# Patient Record
Sex: MALE | Race: WHITE | NOT HISPANIC OR LATINO | Employment: FULL TIME | URBAN - METROPOLITAN AREA
[De-identification: names, ages, dates, MRNs, and addresses within clinical notes are randomized per-mention and may not be internally consistent; named-entity substitution may affect disease eponyms.]

---

## 2017-01-05 ENCOUNTER — GENERIC CONVERSION - ENCOUNTER (OUTPATIENT)
Dept: OTHER | Facility: OTHER | Age: 47
End: 2017-01-05

## 2017-06-06 ENCOUNTER — HOSPITAL ENCOUNTER (OUTPATIENT)
Dept: RADIOLOGY | Age: 47
Discharge: HOME | End: 2017-06-06
Attending: NURSE PRACTITIONER | Admitting: FAMILY MEDICINE
Payer: COMMERCIAL

## 2017-06-06 ENCOUNTER — OFFICE VISIT (OUTPATIENT)
Dept: URGENT CARE | Age: 47
End: 2017-06-06
Payer: COMMERCIAL

## 2017-06-06 ENCOUNTER — TRANSCRIBE ORDERS (OUTPATIENT)
Dept: URGENT CARE | Age: 47
End: 2017-06-06

## 2017-06-06 DIAGNOSIS — S69.91XA UNSPECIFIED INJURY OF RIGHT WRIST, HAND AND FINGER(S), INITIAL ENCOUNTER: ICD-10-CM

## 2017-06-06 PROCEDURE — 99282 EMERGENCY DEPT VISIT SF MDM: CPT | Performed by: FAMILY MEDICINE

## 2017-06-06 PROCEDURE — G0381 LEV 2 HOSP TYPE B ED VISIT: HCPCS | Performed by: FAMILY MEDICINE

## 2017-06-06 PROCEDURE — 29280 STRAPPING OF HAND OR FINGER: CPT | Performed by: FAMILY MEDICINE

## 2017-06-06 PROCEDURE — 29130 APPL FINGER SPLINT STATIC: CPT

## 2017-06-06 PROCEDURE — 73140 X-RAY EXAM OF FINGER(S): CPT

## 2017-06-27 ENCOUNTER — ALLSCRIPTS OFFICE VISIT (OUTPATIENT)
Dept: OTHER | Facility: OTHER | Age: 47
End: 2017-06-27

## 2017-06-27 LAB
FLUAV AG SPEC QL IA: NEGATIVE
INFLUENZA B AG (HISTORICAL): NEGATIVE

## 2017-06-28 ENCOUNTER — GENERIC CONVERSION - ENCOUNTER (OUTPATIENT)
Dept: OTHER | Facility: OTHER | Age: 47
End: 2017-06-28

## 2017-07-05 ENCOUNTER — ALLSCRIPTS OFFICE VISIT (OUTPATIENT)
Dept: OTHER | Facility: OTHER | Age: 47
End: 2017-07-05

## 2017-07-12 ENCOUNTER — ALLSCRIPTS OFFICE VISIT (OUTPATIENT)
Dept: OTHER | Facility: OTHER | Age: 47
End: 2017-07-12

## 2017-07-12 ENCOUNTER — APPOINTMENT (OUTPATIENT)
Dept: RADIOLOGY | Age: 47
End: 2017-07-12
Payer: COMMERCIAL

## 2017-07-12 ENCOUNTER — TRANSCRIBE ORDERS (OUTPATIENT)
Dept: ADMINISTRATIVE | Age: 47
End: 2017-07-12

## 2017-07-12 DIAGNOSIS — M25.561 PAIN IN RIGHT KNEE: ICD-10-CM

## 2017-07-12 PROCEDURE — 73562 X-RAY EXAM OF KNEE 3: CPT

## 2017-07-17 ENCOUNTER — GENERIC CONVERSION - ENCOUNTER (OUTPATIENT)
Dept: OTHER | Facility: OTHER | Age: 47
End: 2017-07-17

## 2017-08-22 ENCOUNTER — TRANSCRIBE ORDERS (OUTPATIENT)
Dept: ADMINISTRATIVE | Facility: HOSPITAL | Age: 47
End: 2017-08-22

## 2017-08-22 ENCOUNTER — ALLSCRIPTS OFFICE VISIT (OUTPATIENT)
Dept: OTHER | Facility: OTHER | Age: 47
End: 2017-08-22

## 2017-08-22 DIAGNOSIS — Z12.5 ENCOUNTER FOR SCREENING FOR MALIGNANT NEOPLASM OF PROSTATE: ICD-10-CM

## 2017-08-22 DIAGNOSIS — R14.0 ABDOMINAL DISTENSION (GASEOUS): ICD-10-CM

## 2017-08-22 DIAGNOSIS — R10.9 ABDOMINAL PAIN: ICD-10-CM

## 2017-08-22 DIAGNOSIS — R16.0 HEPATOMEGALY: ICD-10-CM

## 2017-08-22 DIAGNOSIS — K76.9 LIVER DISEASE: ICD-10-CM

## 2017-08-22 DIAGNOSIS — F10.10 ALCOHOL ABUSE, CONTINUOUS: ICD-10-CM

## 2017-08-22 DIAGNOSIS — R14.0 ABDOMINAL DISTENTION: ICD-10-CM

## 2017-08-22 DIAGNOSIS — R10.9 ABDOMINAL PAIN, UNSPECIFIED SITE: Primary | ICD-10-CM

## 2017-08-22 DIAGNOSIS — F10.10 UNCOMPLICATED ALCOHOL ABUSE: ICD-10-CM

## 2017-08-22 DIAGNOSIS — R16.0 HEPATOMEGALY, NOT ELSEWHERE CLASSIFIED: ICD-10-CM

## 2017-08-23 ENCOUNTER — HOSPITAL ENCOUNTER (OUTPATIENT)
Dept: RADIOLOGY | Age: 47
Discharge: HOME | End: 2017-08-23
Payer: COMMERCIAL

## 2017-08-23 DIAGNOSIS — R16.0 HEPATOMEGALY, NOT ELSEWHERE CLASSIFIED: ICD-10-CM

## 2017-08-23 DIAGNOSIS — R14.0 ABDOMINAL DISTENSION (GASEOUS): ICD-10-CM

## 2017-08-23 DIAGNOSIS — R10.9 ABDOMINAL PAIN: ICD-10-CM

## 2017-08-23 DIAGNOSIS — F10.10 UNCOMPLICATED ALCOHOL ABUSE: ICD-10-CM

## 2017-08-23 PROCEDURE — 74177 CT ABD & PELVIS W/CONTRAST: CPT

## 2017-08-23 RX ADMIN — IOHEXOL 100 ML: 350 INJECTION, SOLUTION INTRAVENOUS at 09:52

## 2017-08-30 ENCOUNTER — ALLSCRIPTS OFFICE VISIT (OUTPATIENT)
Dept: OTHER | Facility: OTHER | Age: 47
End: 2017-08-30

## 2017-08-30 ENCOUNTER — TRANSCRIBE ORDERS (OUTPATIENT)
Dept: ADMINISTRATIVE | Facility: HOSPITAL | Age: 47
End: 2017-08-30

## 2017-08-30 DIAGNOSIS — K76.9 UNSPECIFIED CHRONIC LIVER DISEASE WITHOUT MENTION OF ALCOHOL: Primary | ICD-10-CM

## 2017-09-01 ENCOUNTER — HOSPITAL ENCOUNTER (OUTPATIENT)
Dept: MRI IMAGING | Facility: HOSPITAL | Age: 47
Discharge: HOME | End: 2017-09-01
Attending: SURGERY
Payer: COMMERCIAL

## 2017-09-01 DIAGNOSIS — K76.9 LIVER DISEASE: ICD-10-CM

## 2017-09-01 PROCEDURE — 74183 MRI ABD W/O CNTR FLWD CNTR: CPT

## 2017-09-01 PROCEDURE — A9585 GADOBUTROL INJECTION: HCPCS | Performed by: SURGERY

## 2017-09-01 RX ADMIN — GADOBUTROL 8 ML: 604.72 INJECTION INTRAVENOUS at 18:37

## 2017-09-06 ENCOUNTER — GENERIC CONVERSION - ENCOUNTER (OUTPATIENT)
Dept: OTHER | Facility: OTHER | Age: 47
End: 2017-09-06

## 2017-09-09 ENCOUNTER — GENERIC CONVERSION - ENCOUNTER (OUTPATIENT)
Dept: OTHER | Facility: OTHER | Age: 47
End: 2017-09-09

## 2017-09-10 LAB
AFP-TUMOR MARKER (HISTORICAL): 3.1 NG/ML (ref 0–8.3)
BUN SERPL-MCNC: 6 MG/DL (ref 6–24)
BUN/CREA RATIO (HISTORICAL): 12 (ref 9–20)
CREAT SERPL-MCNC: 0.52 MG/DL (ref 0.76–1.27)
EGFR AFRICAN AMERICAN (HISTORICAL): 148 ML/MIN/1.73
EGFR-AMERICAN CALC (HISTORICAL): 128 ML/MIN/1.73
HEPATITIS A IGM ANTIBODY (HISTORICAL): NEGATIVE
HEPATITIS B CORE IGM ANTIBODY (HISTORICAL): NEGATIVE
HEPATITIS B SURFACE ANTIGEN (HISTORICAL): NEGATIVE
HEPATITIS C ANTIBODY (HISTORICAL): 0.1 S/CO RATIO (ref 0–0.9)

## 2017-09-13 ENCOUNTER — HOSPITAL ENCOUNTER (OUTPATIENT)
Dept: MRI IMAGING | Facility: HOSPITAL | Age: 47
Discharge: HOME | End: 2017-09-13
Attending: SURGERY
Payer: COMMERCIAL

## 2017-09-13 DIAGNOSIS — K76.9 UNSPECIFIED CHRONIC LIVER DISEASE WITHOUT MENTION OF ALCOHOL: ICD-10-CM

## 2017-09-13 PROCEDURE — A9581 GADOXETATE DISODIUM INJ: HCPCS | Performed by: SURGERY

## 2017-09-13 PROCEDURE — 74183 MRI ABD W/O CNTR FLWD CNTR: CPT

## 2017-09-13 RX ADMIN — GADOXETATE DISODIUM 10 ML: 181.43 INJECTION, SOLUTION INTRAVENOUS at 19:39

## 2017-09-25 ENCOUNTER — GENERIC CONVERSION - ENCOUNTER (OUTPATIENT)
Dept: OTHER | Facility: OTHER | Age: 47
End: 2017-09-25

## 2017-09-28 ENCOUNTER — ALLSCRIPTS OFFICE VISIT (OUTPATIENT)
Dept: OTHER | Facility: OTHER | Age: 47
End: 2017-09-28

## 2017-09-29 ENCOUNTER — ANESTHESIA EVENT (OUTPATIENT)
Dept: PERIOP | Facility: HOSPITAL | Age: 47
End: 2017-09-29

## 2017-09-29 NOTE — ANESTHESIA PREPROCEDURE EVALUATION
Review of Systems/Medical History  Patient summary reviewed  Chart reviewed      Cardiovascular  Hyperlipidemia, Hypertension ,    Pulmonary  Smoker cigarette smoker , ,        GI/Hepatic    GERD , Liver disease, alcohol related and cirrhosis,        Negative  ROS        Endo/Other    Comment: ETOH abuse   GYN       Hematology  Negative hematology ROS      Musculoskeletal  Negative musculoskeletal ROS        Neurology  Negative neurology ROS      Psychology   Anxiety,                 Anesthesia Plan  ASA Score- 3       Anesthesia Type- IV sedation with anesthesia        Induction- intravenous      Informed Consent  Anesthetic plan and risks discussed with patient

## 2017-10-02 ENCOUNTER — ANESTHESIA (OUTPATIENT)
Dept: PERIOP | Facility: HOSPITAL | Age: 47
End: 2017-10-02

## 2017-10-11 ENCOUNTER — GENERIC CONVERSION - ENCOUNTER (OUTPATIENT)
Dept: OTHER | Facility: OTHER | Age: 47
End: 2017-10-11

## 2017-10-26 ENCOUNTER — GENERIC CONVERSION - ENCOUNTER (OUTPATIENT)
Dept: OTHER | Facility: OTHER | Age: 47
End: 2017-10-26

## 2017-11-21 ENCOUNTER — ANESTHESIA EVENT (OUTPATIENT)
Dept: GASTROENTEROLOGY | Facility: HOSPITAL | Age: 47
End: 2017-11-21
Payer: COMMERCIAL

## 2017-11-28 ENCOUNTER — ANESTHESIA (OUTPATIENT)
Dept: GASTROENTEROLOGY | Facility: HOSPITAL | Age: 47
End: 2017-11-28
Payer: COMMERCIAL

## 2017-12-07 ENCOUNTER — GENERIC CONVERSION - ENCOUNTER (OUTPATIENT)
Dept: OTHER | Facility: OTHER | Age: 47
End: 2017-12-07

## 2017-12-07 ENCOUNTER — GENERIC CONVERSION - ENCOUNTER (OUTPATIENT)
Dept: GASTROENTEROLOGY | Facility: CLINIC | Age: 47
End: 2017-12-07

## 2017-12-07 ENCOUNTER — HOSPITAL ENCOUNTER (OUTPATIENT)
Facility: HOSPITAL | Age: 47
Setting detail: OUTPATIENT SURGERY
Discharge: HOME | End: 2017-12-07
Attending: INTERNAL MEDICINE | Admitting: INTERNAL MEDICINE
Payer: COMMERCIAL

## 2017-12-07 VITALS
SYSTOLIC BLOOD PRESSURE: 132 MMHG | DIASTOLIC BLOOD PRESSURE: 82 MMHG | TEMPERATURE: 98.2 F | OXYGEN SATURATION: 100 % | RESPIRATION RATE: 19 BRPM | HEART RATE: 83 BPM | WEIGHT: 170 LBS | HEIGHT: 70 IN | BODY MASS INDEX: 24.34 KG/M2

## 2017-12-07 RX ORDER — LIDOCAINE HYDROCHLORIDE 10 MG/ML
INJECTION, SOLUTION EPIDURAL; INFILTRATION; INTRACAUDAL; PERINEURAL AS NEEDED
Status: DISCONTINUED | OUTPATIENT
Start: 2017-12-07 | End: 2017-12-07 | Stop reason: SURG

## 2017-12-07 RX ORDER — MULTIVITAMIN
1 CAPSULE ORAL DAILY
COMMUNITY

## 2017-12-07 RX ORDER — FOLIC ACID 1 MG/1
1 TABLET ORAL DAILY
COMMUNITY

## 2017-12-07 RX ORDER — NADOLOL 40 MG/1
40 TABLET ORAL DAILY
Qty: 30 TABLET | Refills: 0 | Status: ON HOLD | OUTPATIENT
Start: 2017-12-07 | End: 2018-01-24 | Stop reason: ALTCHOICE

## 2017-12-07 RX ORDER — SODIUM CHLORIDE 9 MG/ML
100 INJECTION, SOLUTION INTRAVENOUS CONTINUOUS
Status: DISCONTINUED | OUTPATIENT
Start: 2017-12-07 | End: 2017-12-07 | Stop reason: HOSPADM

## 2017-12-07 RX ORDER — PROPOFOL 10 MG/ML
INJECTION, EMULSION INTRAVENOUS AS NEEDED
Status: DISCONTINUED | OUTPATIENT
Start: 2017-12-07 | End: 2017-12-07 | Stop reason: SURG

## 2017-12-07 RX ADMIN — PROPOFOL 150 MG: 10 INJECTION, EMULSION INTRAVENOUS at 12:58

## 2017-12-07 RX ADMIN — PROPOFOL 50 MG: 10 INJECTION, EMULSION INTRAVENOUS at 13:01

## 2017-12-07 RX ADMIN — LIDOCAINE HYDROCHLORIDE 10 ML: 20 SOLUTION ORAL; TOPICAL at 14:04

## 2017-12-07 RX ADMIN — LIDOCAINE HYDROCHLORIDE 50 MG: 10 INJECTION, SOLUTION EPIDURAL; INFILTRATION; INTRACAUDAL; PERINEURAL at 12:58

## 2017-12-07 RX ADMIN — PROPOFOL 50 MG: 10 INJECTION, EMULSION INTRAVENOUS at 13:03

## 2017-12-07 RX ADMIN — PROPOFOL 50 MG: 10 INJECTION, EMULSION INTRAVENOUS at 13:02

## 2017-12-07 RX ADMIN — PROPOFOL 50 MG: 10 INJECTION, EMULSION INTRAVENOUS at 12:59

## 2017-12-07 RX ADMIN — PROPOFOL 50 MG: 10 INJECTION, EMULSION INTRAVENOUS at 13:00

## 2017-12-07 RX ADMIN — PROPOFOL 30 MG: 10 INJECTION, EMULSION INTRAVENOUS at 13:04

## 2017-12-07 RX ADMIN — SODIUM CHLORIDE: 0.9 INJECTION, SOLUTION INTRAVENOUS at 12:34

## 2017-12-07 NOTE — DISCHARGE INSTRUCTIONS
Liquid diet today and soft diet for 1 week from tomorrow  No aspirin, ibuprofen, aleve  Upper Endoscopy   WHAT YOU NEED TO KNOW:   An upper endoscopy is also called an upper gastrointestinal (GI) endoscopy, or an esophagogastroduodenoscopy (EGD)  You may feel bloated, gassy, or have some abdominal discomfort after your procedure  Your throat may be sore for 24 to 36 hours  You may burp or pass gas from air that is still inside your body  DISCHARGE INSTRUCTIONS:   Call 911 for any of the following:   · You have sudden chest pain or trouble breathing  Seek care immediately if:   · You feel dizzy or faint  · You have trouble swallowing  · Your bowel movements are very dark or black  · Your abdomen is hard and firm and you have severe pain  · You vomit blood  Contact your healthcare provider if:   · You feel full or bloated and cannot burp or pass gas  · You have not had a bowel movement for 3 days after your procedure  · You have neck pain  · You have a fever or chills  · You have nausea or are vomiting  · You have a rash or hives  · You have questions or concerns about your endoscopy  Relieve a sore throat:  Suck on throat lozenges or crushed ice  Gargle with a small amount of warm salt water  Mix 1 teaspoon of salt and 1 cup of warm water to make salt water  Relieve gas and discomfort from bloating:  Lie on your right side with a heating pad on your abdomen  Take short walks to help pass gas  Eat small meals until bloating is relieved  Rest after your procedure: You have been given medicine to relax you  Do not  drive or make important decisions until the day after your procedure  Return to your normal activity as directed  You can usually return to work the day after your procedure  Follow up with your healthcare provider as directed:  Write down your questions so you remember to ask them during your visits     © 2017 [address] is for End User's use only and may not be sold, redistributed or otherwise used for commercial purposes  All illustrations and images included in CareNotes® are the copyrighted property of A D A M , Inc  or Ketan Harris  The above information is an  only  It is not intended as medical advice for individual conditions or treatments  Talk to your doctor, nurse or pharmacist before following any medical regimen to see if it is safe and effective for you  Nadolol (By mouth)   Nadolol (NAY-slade-lol)  Treats high blood pressure and angina (chest pain)  A lower blood pressure can reduce the risk of stroke and heart attack  This medicine is a beta-blocker  Brand Name(s): Corgard   There may be other brand names for this medicine  When This Medicine Should Not Be Used:   Do not use this medicine if you have had an allergic reaction to nadolol  Do not use this medicine if you have asthma or certain heart problems  Talk with your doctor about what these heart problems are  How to Use This Medicine:   Tablet  · Take your medicine as directed  Your dose may need to be changed several times to find what works best for you  · Carefully follow your doctor's instructions about any special diet  If a dose is missed:   · Take a dose as soon as you remember  If it is almost time for your next dose, wait until then and take a regular dose  Do not take extra medicine to make up for a missed dose  How to Store and Dispose of This Medicine:   · Store the medicine in a closed container at room temperature, away from heat, moisture, and direct light  · Ask your pharmacist, doctor, or health caregiver about the best way to dispose of any outdated medicine or medicine no longer needed  · Keep all medicine out of the reach of children  Never share your medicine with anyone    Drugs and Foods to Avoid:   Ask your doctor or pharmacist before using any other medicine, including over-the-counter medicines, vitamins, and herbal products  · Make sure your doctor knows if you are also using digoxin (Lanoxin®), reserpine, insulin, or diabetes medicine that you take by mouth (such as glimepiride, glyburide, metformin, Actos®, Janumet®, Alisha Mullet),  Warnings While Using This Medicine:   · Make sure your doctor knows if you are pregnant or breastfeeding, or if you have kidney disease, heart failure, heart disease, diabetes, lung problems (such as chronic bronchitis or emphysema), overactive thyroid, or a history of allergic reactions  · This medicine may worsen the symptoms of heart failure in some patients  Tell your doctor right away if you have trouble breathing, uneven heartbeat, rapid weight gain, or swelling of the face, fingers, feet, or lower legs  · Do not stop using this medicine suddenly  Your doctor will need to slowly decrease your dose before you stop it completely  · This medicine may make you dizzy or drowsy  Avoid driving, using machines, or doing anything else until you know how this medicine affects you  · Do not stop using this medicine without asking your doctor, even if you feel well  This medicine will not cure your high blood pressure, but it will help keep it in normal range  You may have to take blood pressure medicine for the rest of your life  · Make sure any doctor or dentist who treats you knows that you are using this medicine  · Your doctor will check your progress and the effects of this medicine at regular visits  Keep all appointments    Possible Side Effects While Using This Medicine:   Call your doctor right away if you notice any of these side effects:  · Allergic reaction: Itching or hives, swelling in your face or hands, swelling or tingling in your mouth or throat, chest tightness, trouble breathing  · Lightheadedness, dizziness, or fainting  · Swelling in your hands, ankles, or feet, rapid weight gain  · Trouble breathing, cold sweat, bluish-colored skin  · Very slow or uneven heartbeat  · Worsening chest pain  If you notice these less serious side effects, talk with your doctor:   · Mild dizziness or tiredness  If you notice other side effects that you think are caused by this medicine, tell your doctor  Call your doctor for medical advice about side effects  You may report side effects to FDA at 8-429-FDA-6580  © 2017 [address] Information is for End User's use only and may not be sold, redistributed or otherwise used for commercial purposes  The above information is an  only  It is not intended as medical advice for individual conditions or treatments  Talk to your doctor, nurse or pharmacist before following any medical regimen to see if it is safe and effective for you  Esophageal Varices   WHAT YOU NEED TO KNOW:   Esophageal varices are swollen veins in the lower part of your esophagus  They are caused by increased pressure in the blood vessels of your liver  As the pressure builds in your liver, the pressure also builds in the veins in your esophagus  DISCHARGE INSTRUCTIONS:   Medicines:   · Beta-blockers: These decrease the pressure in your liver  · Proton-pump inhibitors: These decrease the amount of acid your stomach makes  This may help keep the varices from breaking open  · Take your medicine as directed  Contact your healthcare provider if you think your medicine is not helping or if you have side effects  Tell him or her if you are allergic to any medicine  Keep a list of the medicines, vitamins, and herbs you take  Include the amounts, and when and why you take them  Bring the list or the pill bottles to follow-up visits  Carry your medicine list with you in case of an emergency  Follow up with your healthcare provider as directed: You may need to return for more treatments  Write down your questions so you remember to ask them during your visits  Prevent your varices from bleeding:   · Do not drink alcohol:   This will help prevent further damage to your esophagus and liver  · Eat healthy foods:  Healthy foods include fruits, vegetables, whole-grain breads, low-fat dairy products, beans, lean meats, and fish  Ask if you need to be on a special diet  You may need to eat foods that reduce stomach acid  Stomach acid can get into your esophagus and cause the varices to break open and bleed  · Limit sodium:  You may need to decrease the amount of sodium you eat if you have swelling caused by fluid buildup  Fluid buildup can cause increased pressure in your veins  Sodium is found in table salt and salty foods such as canned foods, frozen foods, and potato chips  · Drink liquids as directed: Too much liquid can increase the pressure in your veins  Ask your healthcare provider how much liquid to drink each day and which liquids are best for you  Contact your healthcare provider if:   · You have a fever  · You have questions or concerns about your condition or care  Seek care immediately or call 911 if:   · You have severe abdominal pain  · You see blood in your vomit or bowel movements  · You have chest pain or you are short of breath  · You have trouble thinking clearly  © 2017 [address] [employer]  Information is for End User's use only and may not be sold, redistributed or otherwise used for commercial purposes  All illustrations and images included in CareNotes® are the copyrighted property of A D A M , Inc  or Ketan Harris  The above information is an  only  It is not intended as medical advice for individual conditions or treatments  Talk to your doctor, nurse or pharmacist before following any medical regimen to see if it is safe and effective for you

## 2017-12-07 NOTE — ANESTHESIA PREPROCEDURE EVALUATION
Review of Systems/Medical History  Patient summary reviewed  Chart reviewed      Cardiovascular  Exercise tolerance: good,  Hypertension controlled,    Pulmonary  Smoker 5-10 packs per year , ,        GI/Hepatic       Negative  ROS        Endo/Other  Negative endo/other ROS      GYN       Hematology  Negative hematology ROS      Musculoskeletal  Negative musculoskeletal ROS        Neurology  Negative neurology ROS      Psychology           Physical Exam    Airway    Mallampati score: I  TM Distance: >3 FB  Neck ROM: full     Dental   No notable dental hx     Cardiovascular  Rhythm: regular,     Pulmonary  Breath sounds clear to auscultation,     Other Findings        Anesthesia Plan  ASA Score- 3       Anesthesia Type- IV sedation with anesthesia with ASA Monitors  Additional Monitors:   Airway Plan:           Induction- intravenous  Informed Consent- Anesthetic plan and risks discussed with patient

## 2017-12-07 NOTE — H&P
History and Physical -  [facility]  Nestor Mclain 56 y o  male MRN: [id number]        HPI:  81-year-old male with history of alcoholic cirrhosis who is brought in for surveillance upper endoscopy  Patient has regular bowel movements and denies any blood or mucus in the stool  Appetite is good and denies any recent weight loss  Denies any abdominal pain, nausea, or vomiting  Has no heartburn or acid reflux  Denies any difficulty swallowing  Historical Information   Past Medical History:   Diagnosis Date    Anxiety     Cirrhosis ([address] )     Drug abuse     ETOH abuse     GERD (gastroesophageal reflux disease)     Hyperlipidemia     Hypertension     Liver lesion     Thrombocytopenia (HCC)      Past Surgical History:   Procedure Laterality Date    KNEE ARTHROSCOPY      WISDOM TOOTH EXTRACTION       Social History   History   Alcohol Use No     Comment: 63 days sober thru AA     History   Drug Use    Types: Cocaine     Comment: has not used 30+ days     History   Smoking Status    Current Every Day Smoker    Packs/day: 0 50    Types: Cigarettes   Smokeless Tobacco    Never Used     History reviewed  No pertinent family history  Meds/Allergies     Prescriptions Prior to Admission   Medication    folic acid (FOLVITE) 1 mg tablet    Multiple Vitamin (MULTIVITAMIN) capsule    omeprazole (PriLOSEC) 20 mg delayed release capsule       No Known Allergies    Objective     Blood pressure 139/81, pulse 101, temperature 98 2 °F (36 8 °C), temperature source Temporal, resp  rate 20, height 5' 10" (1 778 m), weight 77 1 kg (170 lb), SpO2 100 %      PHYSICAL EXAM:    Gen: NAD  CV: S1 & S2 normal, RRR  CHEST: Clear to auscultate  ABD: soft, NT/ND, good bowel sounds  EXT: no edema    ASSESSMENT:     Cirrhosis    PLAN:    EGD

## 2017-12-07 NOTE — OP NOTE
**** GI/ENDOSCOPY REPORT ****     PATIENT NAME: DAVID MCGOVERN - VISIT ID:  Patient ID: [id number]   YOB: 1970     INTRODUCTION: Esophagogastroduodenoscopy - A 56 male patient presents for   an outpatient Esophagogastroduodenoscopy at [facility]  INDICATIONS: Cirrhosis  CONSENT: The benefits, risks, and alternatives to the procedure were   discussed and informed consent was obtained from the patient  PREPARATION:  EKG, pulse, pulse oximetry and blood pressure were monitored   throughout the procedure  MEDICATIONS: Anesthesia-check records     PROCEDURE:  The endoscope was passed without difficulty through the mouth   under direct visualization and advanced to the 2nd portion of the   duodenum  The scope was withdrawn and the mucosa was carefully examined  FINDINGS:   Esophagus: 3 columns varices and placed 6 bands  Stomach:   Hypertensive portal gastropathy was found in the stomach  Duodenum: The   duodenum appeared to be normal      COMPLICATIONS: There were no complications  IMPRESSIONS: 3 columns varices and placed 6 bands  Portal gastropathy   found  Normal duodenum  RECOMMENDATIONS: Begin medication as prescribed  Begin taking Nadolol 40mg   daily  ESTIMATED BLOOD LOSS:     PATHOLOGY SPECIMENS:     PROCEDURE CODES:     ICD-9 Codes: 572 8 Other sequelae of chronic liver disease     ICD-10 Codes: K31 89 Other diseases of stomach and duodenum     PERFORMED BY: GEOFF Interiano  on 12/07/2017  Version 1, electronically signed by GEOFF Quinn  on 12/07/2017   at 13:09

## 2017-12-07 NOTE — ANESTHESIA POSTPROCEDURE EVALUATION
Post-Op Assessment Note      CV Status:  Stable    Mental Status:  Alert and awake    Hydration Status:  Euvolemic    PONV Controlled:  Controlled    Airway Patency:  Patent    Post Op Vitals Reviewed: Yes          Staff: Anesthesiologist           /92 (12/07/17 1313)    Temp      Pulse 97 (12/07/17 1313)   Resp 19 (12/07/17 1313)    SpO2 97 % (12/07/17 1313)

## 2017-12-21 ENCOUNTER — ALLSCRIPTS OFFICE VISIT (OUTPATIENT)
Dept: OTHER | Facility: OTHER | Age: 47
End: 2017-12-21

## 2017-12-21 ENCOUNTER — GENERIC CONVERSION - ENCOUNTER (OUTPATIENT)
Dept: OTHER | Facility: OTHER | Age: 47
End: 2017-12-21

## 2017-12-21 DIAGNOSIS — J02.9 ACUTE PHARYNGITIS: ICD-10-CM

## 2017-12-21 DIAGNOSIS — I10 ESSENTIAL (PRIMARY) HYPERTENSION: ICD-10-CM

## 2017-12-21 DIAGNOSIS — I85.00 ESOPHAGEAL VARICES WITHOUT BLEEDING (HCC): ICD-10-CM

## 2017-12-21 DIAGNOSIS — K74.60 CIRRHOSIS OF LIVER (HCC): ICD-10-CM

## 2017-12-21 DIAGNOSIS — E78.5 HYPERLIPIDEMIA: ICD-10-CM

## 2017-12-21 DIAGNOSIS — R16.0 HEPATOMEGALY, NOT ELSEWHERE CLASSIFIED: ICD-10-CM

## 2017-12-26 ENCOUNTER — GENERIC CONVERSION - ENCOUNTER (OUTPATIENT)
Dept: OTHER | Facility: OTHER | Age: 47
End: 2017-12-26

## 2017-12-26 ENCOUNTER — ALLSCRIPTS OFFICE VISIT (OUTPATIENT)
Dept: OTHER | Facility: OTHER | Age: 47
End: 2017-12-26

## 2017-12-27 NOTE — PROGRESS NOTES
Assessment   1  Cirrhosis (571 5) (K74 60)   2  Portal hypertension (572 3) (K76 6)   3  Esophageal varices (456 1) (I85 00)    Plan   Cirrhosis, Esophageal varices, Portal hypertension    · EGD; Status:Hold For - Scheduling; Requested for:[id number];    Perform:[facility]; Due:[id number];Ordered; For:Cirrhosis, Esophageal varices, Portal hypertension; Ordered By:Eduard Wang;   · *1 - SL LIVER DISEASE PROG TRANSPLANT SURGEON Co-Management  *  Status:    Hold For - Scheduling  Requested for: [id number]   Ordered; For: Cirrhosis, Esophageal varices, Portal hypertension; Ordered By: Kerry Hart Performed:  Due: [id number]  Care Summary provided  : Yes  Portal hypertension    · Propranolol HCl - 10 MG Oral Tablet; TAKE 1 TABLET 3 TIMES DAILY   Rx By: Kerry Hart; Dispense: 30 Days ; #:90 Tablet; Refill: 11; For: Portal hypertension; ALICIA = N; Verified Transmission to [address] [place] [address] [id number]; Last Updated By: [employer]; 12/26/2017 10:40:21 AM    Discussion/Summary   Discussion Summary:       Alcoholic cirrhosis, portal hypertension, status post esophageal variceal band ligation- on nadolol but reports having issues with drowsiness and requesting medication change  History of hepatic encephalopathy that has been on lactulose     Schedule for repeat EGD   Advised him to increase his lactulose to twice daily   Check the lab workup including ammonia level   Will change nadolol to propranolol 10 mg 3 times a day   Will make a referral for transplant team at [address] [facility] patient to abstain from alcohol completely      Counseling Documentation With Imm: The patient was counseled regarding instructions for management,-- risk factor reductions,-- patient and family education,-- risks and benefits of treatment options,-- importance of compliance with treatment        Chief Complaint   Chief Complaint Free Text Note Form:       History of Present Illness   HPI:  Marybeth Correia came in for follow-up  I did EGD with esophageal variceal band ligation on December 7  He reports having some chest discomfort for about a week after that  He has been doing well on regular diet now  Denies any abdominal pain, nausea or vomiting  He is complaining about drowsiness on nadolol but his blood pressure has been around 120s  He is requesting to change the nadolol  He takes lactulose once daily and reports having couple of bowel movements  History Reviewed: The history was obtained today from the patient and I agree with the documented history  Review of Systems   Complete-Male GI Adult:      Constitutional: No fever or chills, feels well, no tiredness, no recent weight gain or weight loss  Eyes: No complaints of eye pain, no red eyes, no discharge from eyes, no itchy eyes  ENT: no complaints of earache, no hearing loss, no nosebleeds, no nasal discharge, no sore throat, no hoarseness  Cardiovascular: No complaints of slow heart rate, no fast heart rate, no chest pain, no palpitations, no leg claudication, no lower extremity  Respiratory: No complaints of shortness of breath, no wheezing, no cough, no SOB on exertion, no orthopnea or PND  Gastrointestinal: as noted in HPI  Genitourinary: No complaints of dysuria, no incontinence, no hesitancy, no nocturia, no genital lesion, no testicular pain  Musculoskeletal: No complaints of arthralgia, no myalgias, no joint swelling or stiffness, no limb pain or swelling  Integumentary: No complaints of skin rash or skin lesions, no itching, no skin wound, no dry skin  Neurological: No compliants of headache, no confusion, no convulsions, no numbness or tingling, no dizziness or fainting, no limb weakness, no difficulty walking  Psychiatric: Is not suicidal, no sleep disturbances, no anxiety or depression, no change in personality, no emotional problems        Endocrine: No complaints of proptosis, no hot flashes, no muscle weakness, no erectile dysfunction, no deepening of the voice, no feelings of weakness  Hematologic/Lymphatic: No complaints of swollen glands, no swollen glands in the neck, does not bleed easily, no easy bruising  Active Problems   1  Abdominal discomfort (789 00) (R10 9)   2  Abdominal distention (787 3) (R14 0)   3  Acute diarrhea (787 91) (R19 7)   4  Acute pain of right knee (719 46) (M25 561)   5  Acute pharyngitis (462) (J02 9)   6  Alcoholism (303 90) (F10 20)   7  Atypical nevi (216 9) (D22 9)   8  Cirrhosis (571 5) (K74 60)   9  Contusion of right thumb (923 3) (S60 011A)   10  Drug abuse (305 90) (F19 10)   11  Encounter for prostate cancer screening (V76 44) (Z12 5)   12  Enlarged liver (789 1) (R16 0)   13  Epidermal inclusion cyst (706 2) (L72 0)   14  Esophageal reflux (530 81) (K21 9)   15  Esophageal varices (456 1) (I85 00)   16  ETOH abuse (305 00) (F10 10)   17  Fever (780 60) (R50 9)   18  Generalized abdominal pain (789 07) (R10 84)   19  Hyperlipidemia (272 4) (E78 5)   20  Hypertension (401 9) (I10)   21  Increased ammonia level (790 6) (R79 89)   22  Liver lesion (573 8) (K76 9)   23  Nausea (787 02) (R11 0)   24  Testicle pain (608 9) (N50 819)   25  Thrombocytopenia (287 5) (D69 6)   26  Thumb injury, right, initial encounter (959 5) (P15 85IU)    Past Medical History   1  History of Denial Of Any Significant Medical History   2  History of seasonal allergies (V15 09) (Z88 9)   3  Hyperlipidemia (272 4) (E78 5)   4  Hypertension (401 9) (I10)  Active Problems And Past Medical History Reviewed: The active problems and past medical history were reviewed and updated today  Surgical History   1  History of Knee Arthroscopy With Medial Meniscectomy   2  History of Oral Surgery Tooth Extraction [facility] Tooth  Surgical History Reviewed: The surgical history was reviewed and updated today  Family History   Mother    1  Family history of Breast Cancer (V16 3)   2  Family history of essential hypertension (V17 49) (Z82 49)   3  Family history of hypertension (V17 49) (Z82 49)  Father    4  Family history of essential hypertension (V17 49) (Z82 49)   5  Family history of Prostate Cancer (C93 44)  Family History Reviewed: The family history was reviewed and updated today  Social History    · Always uses seat belt   · Cat   · Current some day smoker (305 1) (F17 200)   ·    · Former consumption of alcohol (V11 3) (Z87 898)   · Marital History - Single   · Never Used Drugs   · Uses Safety Equipment - Seatbelts  Social History Reviewed: The social history was reviewed and updated today  The social history was reviewed and is unchanged  Current Meds    1  CVS Omeprazole 20 MG Oral Tablet Delayed Release; Take 1 tablet daily; Therapy: (Recorded:57Ehl0925) to Recorded   2  Lactulose Encephalopathy 10 GM/15ML Oral Solution; TAKE 30 ML 3 TIMES DAILY; Therapy: 56VIP5937 to (Pamela Benson)  Requested for: 26Oct2017; Last     Rx:09Oct2017 Ordered   3  Lidocaine Viscous 2 % Mouth/Throat Solution; SWALLOW 5 ML Every 6 hours PRN pain; Therapy: 92KQZ6596 to (Evaluate:06Jan2018)  Requested for: 59PUK5535; Last     Rx:[id number] Ordered  Medication List Reviewed: The medication list was reviewed and updated today  Allergies   1  No Known Drug Allergies  2  Seasonal    Vitals   Vital Signs    Recorded: 48POP4117 10:15AM   Temperature 97 9 F   Heart Rate 136   Respiration 16   Systolic 559   Diastolic 80   Height 5 ft 10 in   Weight 173 lb    BMI Calculated 24 82   BSA Calculated 1 96   O2 Saturation 99     Physical Exam        Constitutional      General appearance: No acute distress, well appearing and well nourished  Eyes      Conjunctiva and lids: No swelling, erythema, or discharge  Pupils and irises: Equal, round and reactive to light         Ears, Nose, Mouth, and Throat      External inspection of ears and nose: Normal  Oropharynx: Normal with no erythema, edema, exudate or lesions  Pulmonary      Respiratory effort: No increased work of breathing or signs of respiratory distress  Auscultation of lungs: Clear to auscultation, equal breath sounds bilaterally, no wheezes, no rales, no rhonci  Cardiovascular      Palpation of heart: Normal PMI, no thrills  Auscultation of heart: Normal rate and rhythm, normal S1 and S2, without murmurs  Examination of extremities for edema and/or varicosities: Normal        Carotid pulses: Normal        Abdomen      Abdomen: Non-tender, no masses  Liver and spleen: No hepatomegaly or splenomegaly  Lymphatic      Palpation of lymph nodes in neck: No lymphadenopathy  Musculoskeletal      Gait and station: Normal        Digits and nails: Normal without clubbing or cyanosis  Inspection/palpation of joints, bones, and muscles: Normal        Skin      Skin and subcutaneous tissue: Normal without rashes or lesions  Psychiatric      Orientation to person, place and time: Normal        Mood and affect: Normal           Future Appointments      Date/Time Provider Specialty Site   03/20/2018 01:30 PM GEOFF Cleaning   Surgical Oncology [facility]     Signatures    Electronically signed by : GEOFF Roman ; Dec 26 2017 10:46AM EST                       (Author)

## 2017-12-29 NOTE — PROGRESS NOTES
Assessment   1  Cirrhosis (571 5) (K74 60)   2  Current some day smoker (305 1) (F17 200)   3  Enlarged liver (789 1) (R16 0)   4  Esophageal varices (456 1) (I85 00)   5  Hyperlipidemia (272 4) (E78 5)   6  Hypertension (401 9) (I10)    Plan   Acute pharyngitis, Cirrhosis, Enlarged liver, Esophageal varices, Hyperlipidemia, Hypertension    · (1) CBC/PLT/DIFF; Status:Active; Requested for:76Xlo7046;    · (1) COMPREHENSIVE METABOLIC PANEL; Status:Active; Requested for:36Wkh3157;    · (1) LIPID PANEL FASTING W DIRECT LDL REFLEX; Status:Active; Requested for:00Vdu9407; Alcoholism, Cirrhosis    · (1) AMMONIA; Status:Active; Requested for:32Nkj0320;     Discussion/Summary   Discussion Summary:    Continue to see gi healthy will start to look for another job  Chief Complaint   Chief Complaint Chronic Condition [facility]: Patient is here today for follow up of chronic conditions described in HPI  History of Present Illness   HPI: patient is here for a regular follow up weight from his job for missing too many days egd done and three varices banding drinking good      Review of Systems   Complete-Male:      Constitutional: no fever,-- not feeling poorly,-- no chills-- and-- not feeling tired  Eyes: no eye pain,-- no eyesight problems,-- eyes not red-- and-- no purulent discharge from the eyes  ENT: no earache,-- no nosebleeds,-- no hearing loss-- and-- no nasal discharge  Cardiovascular: the heart rate was not slow,-- no chest pain,-- the heart rate was not fast-- and-- no palpitations  Respiratory: no shortness of breath,-- no cough,-- no wheezing-- and-- no shortness of breath during exertion  Gastrointestinal: no abdominal pain,-- no nausea,-- no constipation-- and-- no diarrhea  Genitourinary: no dysuria,-- no urinary hesitancy-- and-- no nocturia  Musculoskeletal: no arthralgias,-- no joint swelling-- and-- no joint stiffness        Integumentary: no rashes,-- no itching,-- no skin lesions-- and-- no skin wound  Neurological: no headache,-- no numbness,-- no confusion-- and-- no dizziness  Active Problems   1  Abdominal discomfort (789 00) (R10 9)   2  Abdominal distention (787 3) (R14 0)   3  Acute diarrhea (787 91) (R19 7)   4  Acute pain of right knee (719 46) (M25 561)   5  Acute pharyngitis (462) (J02 9)   6  Alcoholism (303 90) (F10 20)   7  Atypical nevi (216 9) (D22 9)   8  Contusion of right thumb (923 3) (S60 011A)   9  Drug abuse (305 90) (F19 10)   10  Encounter for prostate cancer screening (V76 44) (Z12 5)   11  Enlarged liver (789 1) (R16 0)   12  Epidermal inclusion cyst (706 2) (L72 0)   13  Esophageal reflux (530 81) (K21 9)   14  ETOH abuse (305 00) (F10 10)   15  Fever (780 60) (R50 9)   16  Generalized abdominal pain (789 07) (R10 84)   17  Hyperlipidemia (272 4) (E78 5)   18  Hypertension (401 9) (I10)   19  Increased ammonia level (790 6) (R79 89)   20  Liver lesion (573 8) (K76 9)   21  Nausea (787 02) (R11 0)   22  Testicle pain (608 9) (N50 819)   23  Thrombocytopenia (287 5) (D69 6)   24  Thumb injury, right, initial encounter (959 5) (L98 48MA)    Past Medical History   1  History of Denial Of Any Significant Medical History   2  History of seasonal allergies (V15 09) (Z88 9)   3  Hyperlipidemia (272 4) (E78 5)   4  Hypertension (401 9) (I10)  Active Problems And Past Medical History Reviewed: The active problems and past medical history were reviewed and updated today  Surgical History   1  History of Knee Arthroscopy With Medial Meniscectomy   2  History of Oral Surgery Tooth Extraction [place] Tooth  Surgical History Reviewed: The surgical history was reviewed and updated today  Family History   Mother    1  Family history of Breast Cancer (V16 3)   2  Family history of essential hypertension (V17 49) (Z82 49)   3  Family history of hypertension (V17 49) (Z82 49)  Father    4   Family history of essential hypertension (V17 49) (Z82 49)   5  Family history of Prostate Cancer (T13 86)  Family History Reviewed: The family history was reviewed and updated today  Social History    · Always uses seat belt   · Cat   · Current some day smoker (305 1) (F17 200)   ·    · Former consumption of alcohol (V11 3) (Z87 898)   · Marital History - Single   · Never Used Drugs   · Uses Safety Equipment - Seatbelts  Social History Reviewed: The social history was reviewed and updated today  The social history was reviewed and is unchanged  Current Meds    1  [facility] Omeprazole 20 MG Oral Tablet Delayed Release; Therapy: (Anu Serrano) to Recorded   2  Lactulose Encephalopathy 10 GM/15ML Oral Solution; TAKE 30 ML 3 TIMES DAILY; Therapy: 04HME6844 to (Vlad Sosa)  Requested for: 26Oct2017; Last Rx:09Oct2017     Ordered   3  Lidocaine Viscous 2 % Mouth/Throat Solution; SWALLOW 5 ML Every 6 hours PRN pain; Therapy: 05NOU3168 to (Evaluate:06Jan2018)  Requested for: 56GOD5102; Last Rx:79Alm2805     Ordered  Medication List Reviewed: The medication list was reviewed and updated today  Allergies   1  No Known Drug Allergies  2  Seasonal    Vitals   Vital Signs    Recorded: 21Dec2017 09:40AM   Heart Rate 81   Respiration 18   Systolic 900   Diastolic 60   Height 5 ft 10 in   Weight 172 lb 0 6 oz   BMI Calculated 24 68   BSA Calculated 1 96   O2 Saturation 98     Physical Exam        Constitutional      General appearance: No acute distress, well appearing and well nourished  Eyes      Conjunctiva and lids: No swelling, erythema, or discharge  Pupils and irises: Equal, round and reactive to light  Ears, Nose, Mouth, and Throat      External inspection of ears and nose: Normal        Otoscopic examination: Tympanic membrance translucent with normal light reflex  Canals patent without erythema  Nasal mucosa, septum, and turbinates: Normal without edema or erythema         Oropharynx: Normal with no erythema, edema, exudate or lesions  Pulmonary      Respiratory effort: No increased work of breathing or signs of respiratory distress  Auscultation of lungs: Clear to auscultation, equal breath sounds bilaterally, no wheezes, no rales, no rhonci  Cardiovascular      Auscultation of heart: Normal rate and rhythm, normal S1 and S2, without murmurs  Examination of extremities for edema and/or varicosities: Normal        Abdomen      Abdomen: Non-tender, no masses  Liver and spleen: No hepatomegaly or splenomegaly  Lymphatic      Palpation of lymph nodes in neck: No lymphadenopathy  Musculoskeletal      Gait and station: Normal        Digits and nails: Normal without clubbing or cyanosis  Inspection/palpation of joints, bones, and muscles: Normal        Skin      Skin and subcutaneous tissue: Normal without rashes or lesions  Psychiatric      Orientation to person, place and time: Normal        Mood and affect: Normal           Future Appointments      Date/Time Provider Specialty Site   03/20/2018 01:30 PM GEOFF Shields  Surgical Oncology [facility] SURGICAL ONCOLOGY   01/22/2018 02:30 PM GEOFF Elaine   Gastroenterology Adult [address] OUTPATIENT     Signatures    Electronically signed by : Rafael Bills; Dec 28 2017 12:40PM EST                       (Author)     Electronically signed by : Manju Vanegas DO; Dec 28 2017  7:32PM EST                       (Author)

## 2018-01-11 NOTE — RESULT NOTES
Message   Notify the patient elevation of the liver enzyme and abnormal albumin and total protein level please have the patient follow-up with me to discuss; please have the patient avoid Tylenol-containing products and alcohol        Verified Results  (1) COMPREHENSIVE METABOLIC PANEL [id number] 43:26NE Lesa Forward Order Number: [id number]     Test Name Result Flag Reference   GLUCOSE,RANDM 84 mg/dL     If the patient is fasting, the ADA then defines impaired fasting glucose as > 100 mg/dL and diabetes as > or equal to 123 mg/dL  SODIUM 138 mmol/L  136-145   POTASSIUM 4 4 mmol/L  3 5-5 3   CHLORIDE 106 mmol/L  100-108   CARBON DIOXIDE 24 mmol/L  21-32   ANION GAP (CALC) 8 mmol/L  4-13   BLOOD UREA NITROGEN 10 mg/dL  5-25   CREATININE 0 76 mg/dL  0 60-1 30   Standardized to IDMS reference method   CALCIUM 9 4 mg/dL  8 3-10 1   BILI, TOTAL 0 54 mg/dL  0 20-1 00   ALK PHOSPHATAS 115 U/L     ALT (SGPT) 51 U/L  12-78   AST(SGOT) 76 U/L H 5-45   ALBUMIN 2 8 g/dL L 3 5-5 0   TOTAL PROTEIN 8 6 g/dL H 6 4-8 2   eGFR Non-African American      >60 0 ml/min/1 73sq m   - Patient Instructions: This is a fasting blood test  Water,black tea or black  coffee only after 9:00pm the night before test Drink 2 glasses of water the morning of test   National Kidney Disease Education Program recommendations are as follows:  GFR calculation is accurate only with a steady state creatinine  Chronic Kidney disease less than 60 ml/min/1 73 sq  meters  Kidney failure less than 15 ml/min/1 73 sq  meters         Signatures   Electronically signed by : Sandra Meade DO; Aug 31 2016  9:43PM EST                       (Author)

## 2018-01-11 NOTE — PSYCH
History of Present Illness  Psychotherapy Provided [facility]: Individual Psychotherapy 25 minutes minutes provided today  Goals addressed in session:   Patient was scheduled for earlier this morning to see me- see no-show note  Came in ;later for appt and I was able to see him  Recently completed rehab via [facility]  Insurance did not cover and paying bill out of pocket  Has been clean and sober for 11 days  Had been using alcohol and cocaine  Has been attending meetings and seeing individual counseling but counselor does not specialize in addictions  He is requesting referral for same  HPI - Psych: Patient has long hx of substance abuse and prior inpatient and detox treatment  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

## 2018-01-11 NOTE — MISCELLANEOUS
Message  Message Free Text Note Form: Phone call with the patient he does report to me he might be developing mild bronchitis symptoms I did explain to him he should call tomorrow to set up an appointment and if his symptoms were to increase he should go to emergency room        Signatures   Electronically signed by : Nataly Atkinson DO; Aug 31 2016  7:44PM EST                       (Author)

## 2018-01-12 VITALS
HEIGHT: 70 IN | SYSTOLIC BLOOD PRESSURE: 128 MMHG | RESPIRATION RATE: 18 BRPM | WEIGHT: 203 LBS | DIASTOLIC BLOOD PRESSURE: 82 MMHG | OXYGEN SATURATION: 95 % | TEMPERATURE: 98 F | HEART RATE: 108 BPM | BODY MASS INDEX: 29.06 KG/M2

## 2018-01-12 VITALS
OXYGEN SATURATION: 96 % | WEIGHT: 198.03 LBS | DIASTOLIC BLOOD PRESSURE: 92 MMHG | HEART RATE: 107 BPM | RESPIRATION RATE: 20 BRPM | SYSTOLIC BLOOD PRESSURE: 140 MMHG | HEIGHT: 70 IN | BODY MASS INDEX: 28.35 KG/M2

## 2018-01-12 NOTE — RESULT NOTES
Message   notify the patient ultrasound of the scrotum does show bilateral varicoceles please have the patient see urology Dr Munoz Reap follow up as scheduled  Verified Results  [address] [id number] 10:40AM Roseanne Kirk Order Number: [id number]    - Patient Instructions: To schedule this appointment, please contact Central Scheduling at [phone]   Order Number: [id number]    - Patient Instructions: To schedule this appointment, please contact Central Scheduling at [phone]  Test Name Result Flag Reference   US SCROTUM AND TESTICLES (Report)     SCROTAL ULTRASOUND     INDICATION: 40year-old with intermittent testicular pain  COMPARISON: None  TECHNIQUE:  Ultrasound the scrotal contents was performed with a high frequency linear transducer utilizing volumetric sweep imaging as well as standard still image techniques  Imaging performed in longitudinal and transverse orientation  Color and    spectral Doppler evaluation also performed bilaterally  FINDINGS:     TESTES:    Testes are symmetric and normal in size  RIGHT testis = 4 2 x 2 1 x 2 9 cm    Normal contour with homogeneous smooth echotexture  No intratesticular mass lesion or calcifications  LEFT testis = 4 2 x 1 8 x 2 8 cm   Normal contour with homogeneous smooth echotexture  No intratesticular mass lesion or calcifications  Doppler flow within both testes is present and symmetric  EPIDIDYMIDES:    Normal Size  Doppler ultrasound demonstrates normal blood flow  No epididymal lesions  HYDROCELE: No significant fluid present  VARICOCELE: Bilateral varicoceles are present  SCROTUM: Scrotal thickness and appearance within normal limits  No evidence for extratesticular mass or hernia demonstrated  IMPRESSION:        1  Normal appearance of the testes  2  Bilateral varicoceles          Workstation performed: [id number]     Signed by:   Kylee Campo MD   9/1/16     * US ABDOMEN COMPLETE [id number] 10:36AM Percy Beasley Order Number: [id number]    - Patient Instructions: To schedule this appointment, please contact Central Scheduling at [phone]   Order Number: [id number]    - Patient Instructions: To schedule this appointment, please contact Central Scheduling at [phone]  Test Name Result Flag Reference   US ABDOMEN COMPLETE (Report)     ABDOMEN ULTRASOUND, COMPLETE     INDICATION: Hepatomegaly  Abnormal liver function testing  COMPARISON: None  TECHNIQUE:  Real-time ultrasound of the abdomen was performed with a curvilinear transducer with both volumetric sweeps and still imaging techniques  FINDINGS:     PANCREAS: Portions of the pancreas are obscured by bowel gas  Visualized portions of the pancreas are unremarkable  AORTA AND IVC: Visualized portions are normal for patient age  LIVER:   Size: Mildly enlarged  The liver measures 20 6 cm in the midclavicular line  Contour: Surface contour is smooth  Parenchyma: Slightly heterogeneous echotexture without discrete mass  The main portal vein is patent and hepatopetal    Portal vein measures 1 15 cm in diameter  BILIARY:   The gallbladder is normal in caliber  No wall thickening  Minimal free fluid identified adjacent to the left lobe of the liver near the gallbladder fossa  No stones or sludge identified  Sonographic Tana Washington sign is negative  No intrahepatic biliary dilatation  CBD measures 5 5 mm  No choledocholithiasis  KIDNEY:    Right kidney measures 14 4 x 6 0 cm  Within normal limits  Left kidney measures 13 2 x 6 3 cm  Within normal limits  SPLEEN:    Measures 13 4 x 8 0 x 13 4 cm  Within normal limits  ASCITES: As described above there is trace ascites within the gallbladder fossa adjacent to the left lobe of the liver         IMPRESSION:     Hepatomegaly with heterogeneous echotexture but no discrete mass      Trace free fluid identified within the upper abdomen adjacent to the left lobe of the liver  Mild splenomegaly         Workstation performed: [id number]     Signed by:   Asha Dugan DO   9/1/16       Signatures   Electronically signed by : Abril Keller DO; Sep  1 2016  5:44PM EST                       (Author)

## 2018-01-12 NOTE — MISCELLANEOUS
Provider Comments  Provider Comments:   Patient no-showed for appt today        Signatures   Electronically signed by : Lisset Ayala LCSW; Oct 11 2017  1:20PM EST                       (Author)

## 2018-01-12 NOTE — RESULT NOTES
Message   Notify the patient normal CBC except elevation of the MCV please have the patient follow-up with me to discuss a report        Verified Results  (1) COMPREHENSIVE METABOLIC PANEL 95XIU1571 75:65KK Jean Saunders Order Number: [id number]     Test Name Result Flag Reference   GLUCOSE,RANDM 84 mg/dL     If the patient is fasting, the ADA then defines impaired fasting glucose as > 100 mg/dL and diabetes as > or equal to 123 mg/dL  SODIUM 138 mmol/L  136-145   POTASSIUM 4 4 mmol/L  3 5-5 3   CHLORIDE 106 mmol/L  100-108   CARBON DIOXIDE 24 mmol/L  21-32   ANION GAP (CALC) 8 mmol/L  4-13   BLOOD UREA NITROGEN 10 mg/dL  5-25   CREATININE 0 76 mg/dL  0 60-1 30   Standardized to IDMS reference method   CALCIUM 9 4 mg/dL  8 3-10 1   BILI, TOTAL 0 54 mg/dL  0 20-1 00   ALK PHOSPHATAS 115 U/L     ALT (SGPT) 51 U/L  12-78   AST(SGOT) 76 U/L H 5-45   ALBUMIN 2 8 g/dL L 3 5-5 0   TOTAL PROTEIN 8 6 g/dL H 6 4-8 2   eGFR Non-African American      >60 0 ml/min/1 73sq m   - Patient Instructions: This is a fasting blood test  Water,black tea or black  coffee only after 9:00pm the night before test Drink 2 glasses of water the morning of test   National Kidney Disease Education Program recommendations are as follows:  GFR calculation is accurate only with a steady state creatinine  Chronic Kidney disease less than 60 ml/min/1 73 sq  meters  Kidney failure less than 15 ml/min/1 73 sq  meters       (1) CBC/ PLT (NO DIFF) 11Eui5187 12:10PM [place] Saunders Order Number: [id number]     Test Name Result Flag Reference   HEMATOCRIT 42 1 %  36 5-49 3   HEMOGLOBIN 14 2 g/dL  12 0-17 0   MCHC 33 7 g/dL  31 4-37 4   MCH 34 1 pg  26 8-34 3    fL H 82-98   PLATELET COUNT 345 Thousands/uL  149-390   RBC COUNT 4 16 Million/uL  3 88-5 62   RDW 13 6 %  11 6-15 1   WBC COUNT 8 59 Thousand/uL  4 31-10 16   MPV 12 0 fL  8 9-12 7       Signatures   Electronically signed by : Grisel Ferrer DO [place]; Aug 31 2016  9:44PM EST                       (Author)

## 2018-01-13 VITALS
TEMPERATURE: 98.2 F | RESPIRATION RATE: 18 BRPM | SYSTOLIC BLOOD PRESSURE: 160 MMHG | DIASTOLIC BLOOD PRESSURE: 98 MMHG | HEART RATE: 108 BPM | OXYGEN SATURATION: 98 % | WEIGHT: 203 LBS | BODY MASS INDEX: 29.06 KG/M2 | HEIGHT: 70 IN

## 2018-01-13 VITALS
SYSTOLIC BLOOD PRESSURE: 140 MMHG | BODY MASS INDEX: 28.08 KG/M2 | WEIGHT: 196.13 LBS | HEIGHT: 70 IN | DIASTOLIC BLOOD PRESSURE: 88 MMHG | HEART RATE: 92 BPM

## 2018-01-13 VITALS
WEIGHT: 200.5 LBS | BODY MASS INDEX: 28.71 KG/M2 | DIASTOLIC BLOOD PRESSURE: 74 MMHG | HEART RATE: 97 BPM | HEIGHT: 70 IN | OXYGEN SATURATION: 98 % | TEMPERATURE: 99 F | SYSTOLIC BLOOD PRESSURE: 128 MMHG

## 2018-01-13 NOTE — RESULT NOTES
Message   Notify the patient the ultrasound of the abdomen does show enlargement of the liver and also a trace of fluid within the upper abdomen just adjacent to the left lobe of the liver and also shows a mild enlargement of the spleen please have patient follow-up with me to discuss        Verified Results  * [address] [id number] 10:36AM Ritchie Davis Order Number: [id number]    - Patient Instructions: To schedule this appointment, please contact Central Scheduling at [phone]   Order Number: [id number]    - Patient Instructions: To schedule this appointment, please contact Central Scheduling at [phone]  Test Name Result Flag Reference   US ABDOMEN COMPLETE (Report)     ABDOMEN ULTRASOUND, COMPLETE     INDICATION: Hepatomegaly  Abnormal liver function testing  COMPARISON: None  TECHNIQUE:  Real-time ultrasound of the abdomen was performed with a curvilinear transducer with both volumetric sweeps and still imaging techniques  FINDINGS:     PANCREAS: Portions of the pancreas are obscured by bowel gas  Visualized portions of the pancreas are unremarkable  AORTA AND IVC: Visualized portions are normal for patient age  LIVER:   Size: Mildly enlarged  The liver measures 20 6 cm in the midclavicular line  Contour: Surface contour is smooth  Parenchyma: Slightly heterogeneous echotexture without discrete mass  The main portal vein is patent and hepatopetal    Portal vein measures 1 15 cm in diameter  BILIARY:   The gallbladder is normal in caliber  No wall thickening  Minimal free fluid identified adjacent to the left lobe of the liver near the gallbladder fossa  No stones or sludge identified  Sonographic Dwana Nose sign is negative  No intrahepatic biliary dilatation  CBD measures 5 5 mm  No choledocholithiasis  KIDNEY:    Right kidney measures 14 4 x 6 0 cm  Within normal limits  Left kidney measures 13 2 x 6 3 cm  Within normal limits  SPLEEN:    Measures 13 4 x 8 0 x 13 4 cm  Within normal limits  ASCITES: As described above there is trace ascites within the gallbladder fossa adjacent to the left lobe of the liver  IMPRESSION:     Hepatomegaly with heterogeneous echotexture but no discrete mass  Trace free fluid identified within the upper abdomen adjacent to the left lobe of the liver  Mild splenomegaly         Workstation performed: [id number]     Signed by:   Kemi Fabian DO   9/1/16       Signatures   Electronically signed by : Dellia Gosselin, DO; Sep  1 2016  5:41PM EST                       (Author)

## 2018-01-14 VITALS
HEART RATE: 100 BPM | SYSTOLIC BLOOD PRESSURE: 140 MMHG | TEMPERATURE: 99.2 F | DIASTOLIC BLOOD PRESSURE: 90 MMHG | HEIGHT: 70 IN | OXYGEN SATURATION: 96 %

## 2018-01-14 NOTE — RESULT NOTES
Message   Notify the patient normal cholesterol level except a low HDL cholesterol level please have the patient follow up with me to discuss the report        Verified Results  (1) COMPREHENSIVE METABOLIC PANEL [id number] 75:15LR Buffy Thomas Order Number: [id number]     Test Name Result Flag Reference   GLUCOSE,RANDM 84 mg/dL     If the patient is fasting, the ADA then defines impaired fasting glucose as > 100 mg/dL and diabetes as > or equal to 123 mg/dL  SODIUM 138 mmol/L  136-145   POTASSIUM 4 4 mmol/L  3 5-5 3   CHLORIDE 106 mmol/L  100-108   CARBON DIOXIDE 24 mmol/L  21-32   ANION GAP (CALC) 8 mmol/L  4-13   BLOOD UREA NITROGEN 10 mg/dL  5-25   CREATININE 0 76 mg/dL  0 60-1 30   Standardized to IDMS reference method   CALCIUM 9 4 mg/dL  8 3-10 1   BILI, TOTAL 0 54 mg/dL  0 20-1 00   ALK PHOSPHATAS 115 U/L     ALT (SGPT) 51 U/L  12-78   AST(SGOT) 76 U/L H 5-45   ALBUMIN 2 8 g/dL L 3 5-5 0   TOTAL PROTEIN 8 6 g/dL H 6 4-8 2   eGFR Non-African American      >60 0 ml/min/1 73sq m   - Patient Instructions: This is a fasting blood test  Water,black tea or black  coffee only after 9:00pm the night before test Drink 2 glasses of water the morning of test   National Kidney Disease Education Program recommendations are as follows:  GFR calculation is accurate only with a steady state creatinine  Chronic Kidney disease less than 60 ml/min/1 73 sq  meters  Kidney failure less than 15 ml/min/1 73 sq  meters       (1) CBC/ PLT (NO DIFF) [id number] 12:10PM Buffy Thomas Order Number: [id number]     Test Name Result Flag Reference   HEMATOCRIT 42 1 %  36 5-49 3   HEMOGLOBIN 14 2 g/dL  12 0-17 0   MCHC 33 7 g/dL  31 4-37 4   MCH 34 1 pg  26 8-34 3    fL H 82-98   PLATELET COUNT 082 Thousands/uL  149-390   RBC COUNT 4 16 Million/uL  3 88-5 62   RDW 13 6 %  11 6-15 1   WBC COUNT 8 59 Thousand/uL  4 31-10 16   MPV 12 0 fL  8 9-12 7     (1) LIPID PANEL, FASTING [id number] 12:10PM Malgorzata Maciel Order Number: [id number]     Test Name Result Flag Reference   CHOLESTEROL 130 mg/dL     HDL,DIRECT 21 mg/dL L 40-60   Specimen collection should occur prior to Metamizole administration due to the potential for falsely depressed results  LDL CHOLESTEROL CALCULATED 93 mg/dL  0-100   - Patient Instructions: This is a fasting blood test  Water,black tea or black  coffee only after 9:00pm the night before test   Drink 2 glasses of water the morning of test     - Patient Instructions: This is a fasting blood test  Water,black tea or black  coffee only after 9:00pm the night before test Drink 2 glasses of water the morning of test   Triglyceride:         Normal              <150 mg/dl       Borderline High    150-199 mg/dl       High               200-499 mg/dl       Very High          >499 mg/dl  Cholesterol:         Desirable        <200 mg/dl      Borderline High  200-239 mg/dl      High             >239 mg/dl  HDL Cholesterol:        High    >59 mg/dL      Low     <41 mg/dL  LDL CALCULATED:    This screening LDL is a calculated result  It does not have the accuracy of the Direct Measured LDL in the monitoring of patients with hyperlipidemia and/or statin therapy  Direct Measure LDL (SFE091) must be ordered separately in these patients  TRIGLYCERIDES 78 mg/dL  <=150   Specimen collection should occur prior to N-Acetylcysteine or Metamizole administration due to the potential for falsely depressed results         Signatures   Electronically signed by : Joon Mckeon DO; Aug 31 2016  9:44PM EST                       (Author)

## 2018-01-15 NOTE — MISCELLANEOUS
Message  Return to work or school:   Bhavya Auguste is under my professional care  He was seen in my office on September 6, 2017  Please excuse patient for this visit  Dr Chadwick Mcbride MD       Signatures   Electronically signed by :  Rene Villalta, ; Sep  6 2017  5:14PM EST                       (Author)

## 2018-01-16 ENCOUNTER — ANESTHESIA EVENT (OUTPATIENT)
Dept: GASTROENTEROLOGY | Facility: HOSPITAL | Age: 48
End: 2018-01-16
Payer: COMMERCIAL

## 2018-01-16 NOTE — MISCELLANEOUS
To Whom It May Concern,   Alecia Leventhal is under our care and we are treating him for fever/infection  He may return to work 6/30/17  Thank you        Electronically signed by:Primo Hagan  Jun 28 2017  4:31PM EST

## 2018-01-17 NOTE — MISCELLANEOUS
Message  Return to work or school:        Please excuse today's tardiness, Deonna Place was in our office at 9:00am to coordinate appointments  Dr Amy Lopez MD       Signatures   Electronically signed by :  Dartha Gaucher, ; Sep 25 2017  9:05AM EST                       (Author)

## 2018-01-18 NOTE — RESULT NOTES
Verified Results  * XR KNEE 3 VW RIGHT NON INJURY [id number] 04:40PM Dada TOTH Order Number: [id number]     Test Name Result Flag Reference   XR KNEE 3 VW RIGHT (Report)     RIGHT KNEE     INDICATION: Anterior pain and swelling  Recent procedure with infection  COMPARISON: None     VIEWS: 3     IMAGES: 3     FINDINGS:     There is no acute fracture or dislocation  There is no joint effusion  There is soft tissue swelling seen within the infrapatellar bursa  Joint spaces appear preserved  No lytic or blastic lesions are seen  Soft tissues are unremarkable  IMPRESSION:     No acute osseous abnormality         Workstation performed: [id number]     Signed by:   Ivette Chung MD   7/15/17

## 2018-01-18 NOTE — MISCELLANEOUS
Provider Comments  Provider Comments:   Pt was a n/s  Voice mailbox not set up, could not LM        Signatures   Electronically signed by : Nitesh Manzano DO; Jan 5 2017  8:02PM EST                       (Author)

## 2018-01-19 LAB
DEPRECATED RDW RBC AUTO: 15.5 % (ref 12.3–15.4)
HCT VFR BLD AUTO: 38.9 % (ref 37.5–51)
HEMATOLOGY COMMENT (HISTORICAL): ABNORMAL
HGB BLD-MCNC: 13.2 G/DL (ref 13–17.7)
INR PPP: 1.2 (ref 0.8–1.2)
MCH RBC QN AUTO: 31.1 PG (ref 26.6–33)
MCHC RBC AUTO-ENTMCNC: 33.9 G/DL (ref 31.5–35.7)
MCV RBC AUTO: 92 FL (ref 79–97)
PLATELET # BLD AUTO: 94 X10E3/UL (ref 150–379)
PROTHROMBIN TIME: 12.3 SEC (ref 9.1–12)
RBC (HISTORICAL): 4.25 X10E6/UL (ref 4.14–5.8)
WBC # BLD AUTO: 4.8 X10E3/UL (ref 3.4–10.8)

## 2018-01-22 ENCOUNTER — ANESTHESIA (OUTPATIENT)
Dept: GASTROENTEROLOGY | Facility: HOSPITAL | Age: 48
End: 2018-01-22
Payer: COMMERCIAL

## 2018-01-22 VITALS
BODY MASS INDEX: 24.77 KG/M2 | HEART RATE: 136 BPM | RESPIRATION RATE: 16 BRPM | OXYGEN SATURATION: 99 % | WEIGHT: 173 LBS | SYSTOLIC BLOOD PRESSURE: 126 MMHG | TEMPERATURE: 97.9 F | HEIGHT: 70 IN | DIASTOLIC BLOOD PRESSURE: 80 MMHG

## 2018-01-22 VITALS
TEMPERATURE: 98.5 F | DIASTOLIC BLOOD PRESSURE: 90 MMHG | BODY MASS INDEX: 28.49 KG/M2 | RESPIRATION RATE: 16 BRPM | HEIGHT: 70 IN | HEART RATE: 100 BPM | WEIGHT: 199 LBS | SYSTOLIC BLOOD PRESSURE: 140 MMHG

## 2018-01-22 VITALS
SYSTOLIC BLOOD PRESSURE: 118 MMHG | TEMPERATURE: 97 F | WEIGHT: 183 LBS | HEART RATE: 83 BPM | BODY MASS INDEX: 26.26 KG/M2 | DIASTOLIC BLOOD PRESSURE: 76 MMHG | OXYGEN SATURATION: 98 %

## 2018-01-23 VITALS
OXYGEN SATURATION: 98 % | BODY MASS INDEX: 24.63 KG/M2 | HEIGHT: 70 IN | WEIGHT: 172.04 LBS | DIASTOLIC BLOOD PRESSURE: 60 MMHG | HEART RATE: 81 BPM | RESPIRATION RATE: 18 BRPM | SYSTOLIC BLOOD PRESSURE: 104 MMHG

## 2018-01-23 NOTE — MISCELLANEOUS
Message  I faxed information to [facility] program today  Active Problems    1  Abdominal discomfort (789 00) (R10 9)   2  Abdominal distention (787 3) (R14 0)   3  Acute diarrhea (787 91) (R19 7)   4  Acute pain of right knee (719 46) (M25 561)   5  Acute pharyngitis (462) (J02 9)   6  Alcoholism (303 90) (F10 20)   7  Atypical nevi (216 9) (D22 9)   8  Cirrhosis (571 5) (K74 60)   9  Contusion of right thumb (923 3) (S60 011A)   10  Drug abuse (305 90) (F19 10)   11  Encounter for prostate cancer screening (V76 44) (Z12 5)   12  Enlarged liver (789 1) (R16 0)   13  Epidermal inclusion cyst (706 2) (L72 0)   14  Esophageal reflux (530 81) (K21 9)   15  Esophageal varices (456 1) (I85 00)   16  ETOH abuse (305 00) (F10 10)   17  Fever (780 60) (R50 9)   18  Generalized abdominal pain (789 07) (R10 84)   19  Hyperlipidemia (272 4) (E78 5)   20  Hypertension (401 9) (I10)   21  Increased ammonia level (790 6) (R79 89)   22  Liver lesion (573 8) (K76 9)   23  Nausea (787 02) (R11 0)   24  Portal hypertension (572 3) (K76 6)   25  Testicle pain (608 9) (N50 819)   26  Thrombocytopenia (287 5) (D69 6)   27  Thumb injury, right, initial encounter (959 5) (S69 91XA)    Current Meds   1  CVS Omeprazole 20 MG Oral Tablet Delayed Release; Take 1 tablet daily; Therapy: (Recorded:27Llc9829) to Recorded   2  Lactulose Encephalopathy 10 GM/15ML Oral Solution; TAKE 30 ML 3 TIMES DAILY; Therapy: 63WAH4473 to (Cece Watson)  Requested for: 26Oct2017; Last   Rx:09Oct2017 Ordered   3  Lidocaine Viscous 2 % Mouth/Throat Solution; SWALLOW 5 ML Every 6 hours PRN pain; Therapy: 73QOO8123 to (Evaluate:06Jan2018)  Requested for: 08Hrd3963; Last   Rx:77Rjx8376 Ordered   4  Propranolol HCl - 10 MG Oral Tablet; TAKE 1 TABLET 3 TIMES DAILY; Therapy: 01JSV7876 to (Evaluate:49Dsg1297)  Requested for: 26Dec2017; Last   Rx:78Bpo2147 Ordered    Allergies    1  No Known Drug Allergies    2   Seasonal    Plan  Cirrhosis, Esophageal varices, Portal hypertension    · *1 - SL LIVER DISEASE PROG TRANSPLANT SURGEON Co-Management  *  Status:  Hold For - Scheduling  Requested for: 65XBX4512  Care Summary provided  : Yes    Signatures   Electronically signed by : Ara Hardin, ; Dec 26 2017 11:10AM EST                       (Author)

## 2018-01-24 ENCOUNTER — TELEPHONE (OUTPATIENT)
Dept: INTERNAL MEDICINE CLINIC | Facility: CLINIC | Age: 48
End: 2018-01-24

## 2018-01-24 ENCOUNTER — HOSPITAL ENCOUNTER (OUTPATIENT)
Facility: HOSPITAL | Age: 48
Setting detail: OUTPATIENT SURGERY
Discharge: HOME | End: 2018-01-24
Attending: INTERNAL MEDICINE | Admitting: INTERNAL MEDICINE
Payer: COMMERCIAL

## 2018-01-24 ENCOUNTER — TELEPHONE (OUTPATIENT)
Dept: GASTROENTEROLOGY | Facility: MEDICAL CENTER | Age: 48
End: 2018-01-24

## 2018-01-24 VITALS
OXYGEN SATURATION: 100 % | HEART RATE: 90 BPM | BODY MASS INDEX: 24.48 KG/M2 | SYSTOLIC BLOOD PRESSURE: 135 MMHG | TEMPERATURE: 99 F | HEIGHT: 70 IN | DIASTOLIC BLOOD PRESSURE: 92 MMHG | RESPIRATION RATE: 18 BRPM | WEIGHT: 171 LBS

## 2018-01-24 DIAGNOSIS — K76.6 PORTAL HYPERTENSION (HCC): Primary | ICD-10-CM

## 2018-01-24 DIAGNOSIS — K22.89 ESOPHAGEAL PAIN: Primary | ICD-10-CM

## 2018-01-24 PROCEDURE — 43244 EGD VARICES LIGATION: CPT | Performed by: INTERNAL MEDICINE

## 2018-01-24 RX ORDER — SODIUM CHLORIDE 9 MG/ML
INJECTION, SOLUTION INTRAVENOUS CONTINUOUS PRN
Status: DISCONTINUED | OUTPATIENT
Start: 2018-01-24 | End: 2018-01-24 | Stop reason: SURG

## 2018-01-24 RX ORDER — PROPOFOL 10 MG/ML
INJECTION, EMULSION INTRAVENOUS AS NEEDED
Status: DISCONTINUED | OUTPATIENT
Start: 2018-01-24 | End: 2018-01-24 | Stop reason: SURG

## 2018-01-24 RX ORDER — LIDOCAINE HYDROCHLORIDE 10 MG/ML
INJECTION, SOLUTION EPIDURAL; INFILTRATION; INTRACAUDAL; PERINEURAL AS NEEDED
Status: DISCONTINUED | OUTPATIENT
Start: 2018-01-24 | End: 2018-01-24 | Stop reason: SURG

## 2018-01-24 RX ORDER — PROPRANOLOL HYDROCHLORIDE 10 MG/1
10 TABLET ORAL 3 TIMES DAILY
Qty: 90 TABLET | Refills: 0 | Status: SHIPPED | OUTPATIENT
Start: 2018-01-24

## 2018-01-24 RX ADMIN — PROPOFOL 50 MG: 10 INJECTION, EMULSION INTRAVENOUS at 13:13

## 2018-01-24 RX ADMIN — PROPOFOL 150 MG: 10 INJECTION, EMULSION INTRAVENOUS at 13:07

## 2018-01-24 RX ADMIN — LIDOCAINE HYDROCHLORIDE 50 MG: 10 INJECTION, SOLUTION EPIDURAL; INFILTRATION; INTRACAUDAL; PERINEURAL at 13:07

## 2018-01-24 RX ADMIN — PROPOFOL 50 MG: 10 INJECTION, EMULSION INTRAVENOUS at 13:10

## 2018-01-24 RX ADMIN — SODIUM CHLORIDE: 0.9 INJECTION, SOLUTION INTRAVENOUS at 13:01

## 2018-01-24 NOTE — ANESTHESIA POSTPROCEDURE EVALUATION
Post-Op Assessment Note      CV Status:  Stable    Mental Status:  Somnolent    Hydration Status:  Euvolemic    PONV Controlled:  Controlled    Airway Patency:  Patent    Post Op Vitals Reviewed: Yes          Staff: CRNA           BP   129/86   Temp      Pulse 100   Resp   18   SpO2   100

## 2018-01-24 NOTE — ANESTHESIA PREPROCEDURE EVALUATION
Review of Systems/Medical History  Patient summary reviewed  Chart reviewed      Cardiovascular  Exercise tolerance: good,  Hyperlipidemia, Hypertension ,    Pulmonary  Smoker cigarette smoker  , Tobacco cessation counseling given ,        GI/Hepatic    GERD , Liver disease, alcohol related and cirrhosis,        Negative  ROS        Endo/Other  Negative endo/other ROS      GYN  Negative gynecology ROS          Hematology  Negative hematology ROS      Musculoskeletal  Negative musculoskeletal ROS        Neurology  Negative neurology ROS      Psychology   Anxiety,              Physical Exam    Airway    Mallampati score: II  TM Distance: >3 FB  Neck ROM: full     Dental       Cardiovascular  Rhythm: regular, Rate: normal,     Pulmonary  Breath sounds clear to auscultation,     Other Findings        Anesthesia Plan  ASA Score- 3     Anesthesia Type- IV sedation with anesthesia with ASA Monitors  Additional Monitors:   Airway Plan:         Plan Factors- Patient instructed to abstain from smoking on day of procedure  Patient smoked on day of surgery  Induction- intravenous  Postoperative Plan-     Informed Consent- Anesthetic plan and risks discussed with patient and spouse

## 2018-01-24 NOTE — OP NOTE
**** GI/ENDOSCOPY REPORT ****     PATIENT NAME: DAVID MCGOVERN - VISIT ID:  Patient ID: [id number]   YOB: 1970     INTRODUCTION: Esophagogastroduodenoscopy - A 56 male patient presents for   an outpatient Esophagogastroduodenoscopy at [place] [address]  INDICATIONS: Cirrhosis and Hx variceal banding  CONSENT: The benefits, risks, and alternatives to the procedure were   discussed and informed consent was obtained from the patient  PREPARATION:  EKG, pulse, pulse oximetry and blood pressure were monitored   throughout the procedure  MEDICATIONS: Anesthesia-check records     PROCEDURE:  The endoscope was passed without difficulty through the mouth   under direct visualization and advanced to the 2nd portion of the   duodenum  The scope was withdrawn and the mucosa was carefully examined  FINDINGS:   Esophagus: Scarring noted from previous banding  3 columns   varices s/p band ligation x 6  Stomach: Hypertensive portal gastropathy   was found in the stomach  Duodenum: The duodenum appeared to be normal      COMPLICATIONS: There were no complications  IMPRESSIONS: Scarring noted from previous banding  3 columns varices s/p   band ligation x 6  Portal gastropathy found  Normal duodenum  RECOMMENDATIONS: Advised him that he should take propranolol as   prescribed  EGD recommended in 2 months  ESTIMATED BLOOD LOSS:     PATHOLOGY SPECIMENS:     PROCEDURE CODES:     ICD-9 Codes: 572 8 Other sequelae of chronic liver disease     ICD-10 Codes: K31 89 Other diseases of stomach and duodenum     PERFORMED BY: GEOFF Manning  on 01/24/2018  Version 1, electronically signed by GEOFF Ray  on 01/24/2018   at 13:19

## 2018-01-25 DIAGNOSIS — K22.89 ESOPHAGEAL PAIN: ICD-10-CM

## 2018-01-25 NOTE — TELEPHONE ENCOUNTER
Had issues with new EPIC yesterday  Prescription was sent to [facility]   Please call amd make sure

## 2018-02-07 DIAGNOSIS — K76.9 LIVER LESION: Primary | ICD-10-CM

## 2018-02-22 ENCOUNTER — PREP FOR PROCEDURE (OUTPATIENT)
Dept: GASTROENTEROLOGY | Facility: AMBULARY SURGERY CENTER | Age: 48
End: 2018-02-22

## 2018-02-22 DIAGNOSIS — I85.00 ESOPHAGEAL VARICES WITHOUT BLEEDING, UNSPECIFIED ESOPHAGEAL VARICES TYPE (HCC): Primary | ICD-10-CM

## 2018-03-02 ENCOUNTER — TELEPHONE (OUTPATIENT)
Dept: GASTROENTEROLOGY | Facility: AMBULARY SURGERY CENTER | Age: 48
End: 2018-03-02

## 2018-03-08 ENCOUNTER — APPOINTMENT (OUTPATIENT)
Dept: LAB | Facility: CLINIC | Age: 48
End: 2018-03-08
Payer: COMMERCIAL

## 2018-03-08 LAB
AFP-TM SERPL-MCNC: 4.4 NG/ML (ref 0.5–8)
BUN SERPL-MCNC: 9 MG/DL (ref 5–25)
CREAT SERPL-MCNC: 0.65 MG/DL (ref 0.6–1.3)
GFR SERPL CREATININE-BSD FRML MDRD: 117 ML/MIN/1.73SQ M

## 2018-03-08 PROCEDURE — 82565 ASSAY OF CREATININE: CPT

## 2018-03-08 PROCEDURE — 36415 COLL VENOUS BLD VENIPUNCTURE: CPT

## 2018-03-08 PROCEDURE — 84520 ASSAY OF UREA NITROGEN: CPT

## 2018-03-08 PROCEDURE — 82105 ALPHA-FETOPROTEIN SERUM: CPT

## 2018-03-10 ENCOUNTER — HOSPITAL ENCOUNTER (OUTPATIENT)
Dept: MRI IMAGING | Facility: HOSPITAL | Age: 48
Discharge: HOME | End: 2018-03-10
Attending: SURGERY
Payer: COMMERCIAL

## 2018-03-10 DIAGNOSIS — K76.9 CHRONIC LIVER DISEASE: ICD-10-CM

## 2018-03-10 PROCEDURE — A9581 GADOXETATE DISODIUM INJ: HCPCS | Performed by: SURGERY

## 2018-03-10 PROCEDURE — 74183 MRI ABD W/O CNTR FLWD CNTR: CPT

## 2018-03-10 RX ADMIN — GADOXETATE DISODIUM 10 ML: 181.43 INJECTION, SOLUTION INTRAVENOUS at 14:30

## 2018-03-17 DIAGNOSIS — K59.00 CONSTIPATION, UNSPECIFIED CONSTIPATION TYPE: Primary | ICD-10-CM

## 2018-03-18 RX ORDER — LACTULOSE 10 G/15ML
SOLUTION ORAL
Qty: 630 ML | Refills: 2 | Status: SHIPPED | OUTPATIENT
Start: 2018-03-18

## 2018-03-27 ENCOUNTER — OFFICE VISIT (OUTPATIENT)
Dept: SURGICAL ONCOLOGY | Facility: CLINIC | Age: 48
End: 2018-03-27
Payer: COMMERCIAL

## 2018-03-27 VITALS
SYSTOLIC BLOOD PRESSURE: 122 MMHG | BODY MASS INDEX: 25.34 KG/M2 | HEIGHT: 70 IN | HEART RATE: 74 BPM | TEMPERATURE: 97.6 F | WEIGHT: 177 LBS | RESPIRATION RATE: 16 BRPM | DIASTOLIC BLOOD PRESSURE: 78 MMHG

## 2018-03-27 DIAGNOSIS — K70.30 ALCOHOLIC CIRRHOSIS OF LIVER WITHOUT ASCITES (HCC): Primary | ICD-10-CM

## 2018-03-27 PROCEDURE — 99213 OFFICE O/P EST LOW 20 MIN: CPT | Performed by: SURGERY

## 2018-03-27 NOTE — PROGRESS NOTES
Surgical Oncology Follow Up       [address] [place] [address]  [facility] [address] [address] [facility] [place], operated [facility]  1970  [id number]  [address] [place] [address]  [facility] [address] [address]  Diagnoses and all orders for this visit:    Alcoholic cirrhosis of liver without ascites ([address] )  -     MRI abdomen w wo contrast; Future  -     BUN; Future  -     Creatinine, serum; Future  -     AFP tumor marker; Future        Chief Complaint   Patient presents with    Follow-up          No history exists  History of Present Illness:   Patient returns in follow-up  He is feeling well  No abdominal pain, nausea or vomiting  He has lost weight intentionally  His energy level has improved  MRI on March 10, 2018 reveals persistent enhancement in the liver without any obvious cystic or solid lesions  This was felt to be related to progressive cirrhosis and fibrosis  There was also evidence of portal hypertension  I personally reviewed the films  His AFP level was 4 4  Review of Systems  Complete ROS Surg Onc:   Complete ROS Surg Onc:   Constitutional: The patient denies new or recent history of general fatigue, no recent weight loss, no change in appetite  Eyes: No complaints of visual problems, no scleral icterus  ENT: no complaints of ear pain, no hoarseness, no difficulty swallowing,  no tinnitus and no new masses in head, oral cavity, or neck  Cardiovascular: No complaints of chest pain, no palpitations, no ankle edema  Respiratory: No complaints of shortness of breath, no cough  Gastrointestinal: No complaints of jaundice, no bloody stools, no pale stools  Genitourinary: No complaints of dysuria, no hematuria, no nocturia, no frequent urination, no urethral discharge  Musculoskeletal: No complaints of weakness, paralysis, joint stiffness or arthralgias    Integumentary: No complaints of rash, no new lesions  Neurological: No complaints of convulsions, no seizures, no dizziness  Hematologic/Lymphatic: No complaints of easy bruising  Endocrine:  No hot or cold intolerance  No polydipsia, polyphagia, or polyuria  Allergy/immunology:  No environmental allergies  No food allergies  Not immunocompromised  Skin:  No pallor or rash  No wound  Patient Active Problem List   Diagnosis    Esophageal varices without bleeding [facility])     Past Medical History:   Diagnosis Date    Anxiety     Cirrhosis ([place] [address] )     Drug abuse     ETOH abuse     GERD (gastroesophageal reflux disease)     Hyperlipidemia     Hypertension     Liver lesion     Thrombocytopenia ([facility] )      Past Surgical History:   Procedure Laterality Date    KNEE ARTHROSCOPY      VT ESOPHAGOGASTRODUODENOSCOPY TRANSORAL DIAGNOSTIC N/A 12/7/2017    Procedure: ESOPHAGOGASTRODUODENOSCOPY (EGD); Surgeon: Aristeo Rodriguez MD;  [facility]; Service: Gastroenterology    VT ESOPHAGOGASTRODUODENOSCOPY TRANSORAL DIAGNOSTIC N/A 1/24/2018    Procedure: ESOPHAGOGASTRODUODENOSCOPY (EGD); Surgeon: Aristeo Rodriguez MD;  [facility]; Service: Gastroenterology    WISDOM TOOTH EXTRACTION       No family history on file  Social History     Social History    Marital status: Single     Spouse name: N/A    Number of children: N/A    Years of education: N/A     Occupational History    Not on file       Social History Main Topics    Smoking status: Current Every Day Smoker     Packs/day: 0 50     Types: Cigarettes    Smokeless tobacco: Never Used    Alcohol use No    Drug use: No      Comment: history of cocaine use    Sexual activity: Not on file     Other Topics Concern    Not on file     Social History Narrative    No narrative on file       Current Outpatient Prescriptions:     CONSTULOSE 10 GM/15ML solution, TAKE 30ML THREE TIMES DAILY, Disp: 630 mL, Rfl: 2    folic acid (FOLVITE) 1 mg tablet, Take 1 mg by mouth daily, Disp: , Rfl:     Multiple Vitamin (MULTIVITAMIN) capsule, Take 1 capsule by mouth daily, Disp: , Rfl:     omeprazole (PriLOSEC) 20 mg delayed release capsule, Take 20 mg by mouth daily, Disp: , Rfl:     propranolol (INDERAL) 10 mg tablet, Take 1 tablet by mouth 3 (three) times a day, Disp: 90 tablet, Rfl: 0  Allergies   Allergen Reactions    Pollen Extract      Vitals:    03/27/18 1417   BP: 122/78   Pulse: 74   Resp: 16   Temp: 97 6 °F (36 4 °C)       Physical Exam  Constitutional: General appearance: The Patient is well-developed and well-nourished who appears the stated age in no acute distress  Patient is pleasant and talkative  HEENT:  Normocephalic  Sclerae are anicteric  Mucous membranes are moist  Neck is supple without adenopathy  No JVD  Chest: The lungs are clear to auscultation  Cardiac: Heart is regular rate  Abdomen: Abdomen is soft, non-tender, non-distended and without masses  Extremities: There is no clubbing or cyanosis  There is no edema  Symmetric  Neuro: Grossly nonfocal  Gait is normal      Lymphatic: No evidence of cervical adenopathy bilaterally  No evidence of axillary adenopathy bilaterally  No evidence of inguinal adenopathy bilaterally  Skin: Warm, anicteric  Psych:  Patient is pleasant and talkative  Pathology:      Labs:    Imaging  Mri Abdomen W Wo Contrast    Result Date: 3/13/2018  Narrative: MRI OF THE ABDOMEN (LIVER) WITH AND WITHOUT CONTRAST INDICATION: K76 9: Liver disease, unspecified COMPARISON: MRI 9/13/2017   TECHNIQUE: The following pulse sequences were obtained on a 1 5 T scanner:  Axial T1-weighted in-and-out-of phase, pre-contrast axial T1 with fat saturation, post-contrast dynamic axial T1 with fat saturation at 20, 70, and 180 seconds, coronal T1 with fat saturation, coronal and axial T2 with TE of 90 and 180 respectively, 10 minute delayed axial T1 with fat saturation through the liver, axial DWI/ADC, axial T2 with fat saturation, , and 20 minute delayed axial T1 with fat saturation through the liver  IV Contrast:  10 mL of gadoxetate disodium SOLN FINDINGS: LIVER:  General:  Diffuse surface nodularity in keeping with cirrhosis is again seen  The liver overall appears more nodular and shrunken compared to the prior study Hypertrophy of the caudate lobe also again noted  Lesions: Diffuse heterogeneous enhancement of the liver on arterial phase of contrast without focal cystic or solid lesions is again seen  No areas of abnormal arterial enhancement  There are confluent areas of hypointensity on the 10 or 20 minute delayed images without focal mass, which is different in appearance from the prior study of 9/13/2017  Vasculature: Portal and hepatic veins patent without evidence of thrombosis  Recanalized periumbilical vein and mesenteric collaterals are again seen in keeping with portal hypertension  BILIARY TREE:  Normal   GALLBLADDER:  Normal  PANCREAS:  Normal  ADRENAL GLANDS:  Normal  SPLEEN: Normal  KIDNEYS:  Normal  ABDOMINAL CAVITY:  No lymphadenopathy or mass  No ascites  BOWEL:  Unremarkable MRI appearance  OSSEOUS STRUCTURES:  No osseous destruction  EXTRAHEPATIC VASCULAR STRUCTURES:  Visualized vasculature is normal  ABDOMINAL WALL:  Sebaceous cyst within the posterior subcutaneous tissues of the mid back is again noted  LOWER CHEST:  Unremarkable MRI appearance  Impression: 1  Persistent heterogeneous enhancement of the liver on arterial phase without focal cystic or solid lesions  There are new confluent areas of hypointensity on the 10 and 20 minute delayed images without focal mass  Given the normal AFP of 3/8/2018 and lack of corresponding abnormality on other sequences, the finding on the delayed images is felt to likely be related to progressive cirrhosis and fibrosis as also evidence by progressive surface nodularity and transit appearance of the liver    Recommend continued surveillance with follow-up MRI in 6 months  2   Evidence of portal hypertension  Workstation performed: [id number]     I reviewed the above laboratory and imaging data  Discussion/Summary:     69-year-old male with a history of alcohol abuse and CT findings suspicious for multifocal HCC  Follow-up MRI has been stable with no obvious arterially enhancing masses  His AFP level is normal  We will continue with observation  I will repeat his MRI and AFP level 6 months  If this is normal, I would then have him follow up with his Gastroenterology for his routine [address]  screening  He is agreeable to this  All his questions were answered

## 2018-03-27 NOTE — LETTER
March 27, 2018     Ulises Ovalle  [address] [facility] [address] [place] [address]    Patient: Jayro Naik   YOB: 1970   Date of Visit: 3/27/2018       Dear Dr Oscar Jack: Thank you for referring Jayro Naik to me for evaluation  Below are my notes for this consultation  If you have questions, please do not hesitate to call me  I look forward to following your patient along with you  Sincerely,        Henrietta Cortes MD        CC: MD Henrietta Shah MD  3/27/2018  2:41 PM  Sign at close encounter               Surgical Oncology Follow Up       [address] [place] [address] [id number] [place] [address]  1970  [id number]  [address] [place] [address],[address]  [facility] [place] [address]    Chief Complaint   Patient presents with    Follow-up          No history exists  History of Present Illness:   Patient returns in follow-up  He is feeling well  No abdominal pain, nausea or vomiting  He has lost weight intentionally  His energy level has improved  MRI on March 10, 2018 reveals persistent enhancement in the liver without any obvious cystic or solid lesions  This was felt to be related to progressive cirrhosis and fibrosis  There was also evidence of portal hypertension  I personally reviewed the films  His AFP level was 4 4  Review of Systems  Complete ROS Surg Onc:   Complete ROS Surg Onc:   Constitutional: The patient denies new or recent history of general fatigue, no recent weight loss, no change in appetite  Eyes: No complaints of visual problems, no scleral icterus  ENT: no complaints of ear pain, no hoarseness, no difficulty swallowing,  no tinnitus and no new masses in head, oral cavity, or neck  Cardiovascular: No complaints of chest pain, no palpitations, no ankle edema     Respiratory: No complaints of shortness of breath, no cough  Gastrointestinal: No complaints of jaundice, no bloody stools, no pale stools  Genitourinary: No complaints of dysuria, no hematuria, no nocturia, no frequent urination, no urethral discharge  Musculoskeletal: No complaints of weakness, paralysis, joint stiffness or arthralgias  Integumentary: No complaints of rash, no new lesions  Neurological: No complaints of convulsions, no seizures, no dizziness  Hematologic/Lymphatic: No complaints of easy bruising  Endocrine:  No hot or cold intolerance  No polydipsia, polyphagia, or polyuria  Allergy/immunology:  No environmental allergies  No food allergies  Not immunocompromised  Skin:  No pallor or rash  No wound  Patient Active Problem List   Diagnosis    Esophageal varices without bleeding [facility])     Past Medical History:   Diagnosis Date    Anxiety     Cirrhosis ([place] [address] )     Drug abuse     ETOH abuse     GERD (gastroesophageal reflux disease)     Hyperlipidemia     Hypertension     Liver lesion     Thrombocytopenia ([place] [address] )      Past Surgical History:   Procedure Laterality Date    KNEE ARTHROSCOPY      [facility] ESOPHAGOGASTRODUODENOSCOPY TRANSORAL DIAGNOSTIC N/A 12/7/2017    Procedure: ESOPHAGOGASTRODUODENOSCOPY (EGD); Surgeon: Mario Connolly MD;  [facility]; Service: Gastroenterology    VA ESOPHAGOGASTRODUODENOSCOPY TRANSORAL DIAGNOSTIC N/A 1/24/2018    Procedure: ESOPHAGOGASTRODUODENOSCOPY (EGD); Surgeon: Mario Connolly MD;  [facility]; Service: Gastroenterology    WISDOM TOOTH EXTRACTION       No family history on file  Social History     Social History    Marital status: Single     Spouse name: N/A    Number of children: N/A    Years of education: N/A     Occupational History    Not on file       Social History Main Topics    Smoking status: Current Every Day Smoker     Packs/day: 0 50     Types: Cigarettes    Smokeless tobacco: Never Used    Alcohol use No    Drug use: No      Comment: history of cocaine use    Sexual activity: Not on file     Other Topics Concern    Not on file     Social History Narrative    No narrative on file       Current Outpatient Prescriptions:     CONSTULOSE 10 GM/15ML solution, TAKE 30ML THREE TIMES DAILY, Disp: 630 mL, Rfl: 2    folic acid (FOLVITE) 1 mg tablet, Take 1 mg by mouth daily, Disp: , Rfl:     Multiple Vitamin (MULTIVITAMIN) capsule, Take 1 capsule by mouth daily, Disp: , Rfl:     omeprazole (PriLOSEC) 20 mg delayed release capsule, Take 20 mg by mouth daily, Disp: , Rfl:     propranolol (INDERAL) 10 mg tablet, Take 1 tablet by mouth 3 (three) times a day, Disp: 90 tablet, Rfl: 0  Allergies   Allergen Reactions    Pollen Extract      Vitals:    03/27/18 1417   BP: 122/78   Pulse: 74   Resp: 16   Temp: 97 6 °F (36 4 °C)       Physical Exam  Constitutional: General appearance: The Patient is well-developed and well-nourished who appears the stated age in no acute distress  Patient is pleasant and talkative  HEENT:  Normocephalic  Sclerae are anicteric  Mucous membranes are moist  Neck is supple without adenopathy  No JVD  Chest: The lungs are clear to auscultation  Cardiac: Heart is regular rate  Abdomen: Abdomen is soft, non-tender, non-distended and without masses  Extremities: There is no clubbing or cyanosis  There is no edema  Symmetric  Neuro: Grossly nonfocal  Gait is normal      Lymphatic: No evidence of cervical adenopathy bilaterally  No evidence of axillary adenopathy bilaterally  No evidence of inguinal adenopathy bilaterally  Skin: Warm, anicteric  Psych:  Patient is pleasant and talkative  Pathology:      Labs:    Imaging  Mri Abdomen W Wo Contrast    Result Date: 3/13/2018  Narrative: MRI OF THE ABDOMEN (LIVER) WITH AND WITHOUT CONTRAST INDICATION: K76 9: Liver disease, unspecified COMPARISON: MRI 9/13/2017   TECHNIQUE: The following pulse sequences were obtained on a 1 5 T scanner:  Axial T1-weighted in-and-out-of phase, pre-contrast axial T1 with fat saturation, post-contrast dynamic axial T1 with fat saturation at 20, 70, and 180 seconds, coronal T1 with fat saturation, coronal and axial T2 with TE of 90 and 180 respectively, 10 minute delayed axial T1 with fat saturation through the liver, axial DWI/ADC, axial T2 with fat saturation, , and 20 minute delayed axial T1 with fat saturation through the liver  IV Contrast:  10 mL of gadoxetate disodium SOLN FINDINGS: LIVER:  General:  Diffuse surface nodularity in keeping with cirrhosis is again seen  The liver overall appears more nodular and shrunken compared to the prior study Hypertrophy of the caudate lobe also again noted  Lesions: Diffuse heterogeneous enhancement of the liver on arterial phase of contrast without focal cystic or solid lesions is again seen  No areas of abnormal arterial enhancement  There are confluent areas of hypointensity on the 10 or 20 minute delayed images without focal mass, which is different in appearance from the prior study of 9/13/2017  Vasculature: Portal and hepatic veins patent without evidence of thrombosis  Recanalized periumbilical vein and mesenteric collaterals are again seen in keeping with portal hypertension  BILIARY TREE:  Normal   GALLBLADDER:  Normal  PANCREAS:  Normal  ADRENAL GLANDS:  Normal  SPLEEN: Normal  KIDNEYS:  Normal  ABDOMINAL CAVITY:  No lymphadenopathy or mass  No ascites  BOWEL:  Unremarkable MRI appearance  OSSEOUS STRUCTURES:  No osseous destruction  EXTRAHEPATIC VASCULAR STRUCTURES:  Visualized vasculature is normal  ABDOMINAL WALL:  Sebaceous cyst within the posterior subcutaneous tissues of the mid back is again noted  LOWER CHEST:  Unremarkable MRI appearance  Impression: 1  Persistent heterogeneous enhancement of the liver on arterial phase without focal cystic or solid lesions    There are new confluent areas of hypointensity on the 10 and 20 minute delayed images without focal mass  Given the normal AFP of 3/8/2018 and lack of corresponding abnormality on other sequences, the finding on the delayed images is felt to likely be related to progressive cirrhosis and fibrosis as also evidence by progressive surface nodularity and transit appearance of the liver  Recommend continued surveillance with follow-up MRI in 6 months  2   Evidence of portal hypertension  Workstation performed: [id number]     I reviewed the above laboratory and imaging data  Discussion/Summary:     60-year-old male with a history of alcohol abuse and CT findings suspicious for multifocal HCC  Follow-up MRI has been stable with no obvious arterially enhancing masses  His AFP level is normal  We will continue with observation  I will repeat his MRI and AFP level 6 months  If this is normal, I would then have him follow up with his Gastroenterology for his routine [facility] [address]  screening  He is agreeable to this  All his questions were answered

## 2018-03-28 ENCOUNTER — TELEPHONE (OUTPATIENT)
Dept: GASTROENTEROLOGY | Facility: AMBULARY SURGERY CENTER | Age: 48
End: 2018-03-28

## 2018-04-13 ENCOUNTER — TELEPHONE (OUTPATIENT)
Dept: GASTROENTEROLOGY | Facility: AMBULARY SURGERY CENTER | Age: 48
End: 2018-04-13

## 2018-05-31 ENCOUNTER — ANESTHESIA (OUTPATIENT)
Dept: GASTROENTEROLOGY | Facility: HOSPITAL | Age: 48
End: 2018-05-31

## 2018-05-31 ENCOUNTER — ANESTHESIA EVENT (OUTPATIENT)
Dept: GASTROENTEROLOGY | Facility: HOSPITAL | Age: 48
End: 2018-05-31

## 2018-05-31 NOTE — ANESTHESIA PREPROCEDURE EVALUATION
Review of Systems/Medical History  Patient summary reviewed  Chart reviewed      Cardiovascular  Exercise tolerance (METS): >4,  Hyperlipidemia, Hypertension controlled,    Pulmonary  Smoker cigarette smoker  ,        GI/Hepatic    GERD well controlled, Liver disease , cirrhosis,        Negative  ROS        Endo/Other  Negative endo/other ROS      GYN       Hematology  Negative hematology ROS      Musculoskeletal  Negative musculoskeletal ROS        Neurology  Negative neurology ROS      Psychology   Anxiety,              Physical Exam    Airway    Mallampati score: II  TM Distance: >3 FB  Neck ROM: full     Dental   No notable dental hx     Cardiovascular  Cardiovascular exam normal    Pulmonary  Pulmonary exam normal     Other Findings        Anesthesia Plan  ASA Score- 3     Anesthesia Type- IV sedation with anesthesia with ASA Monitors  Additional Monitors:   Airway Plan:         Plan Factors-  Patient did not smoke on day of surgery  Induction- intravenous  Postoperative Plan-     Informed Consent- Anesthetic plan and risks discussed with patient

## (undated) DEVICE — LIGATOR MULTI 7 BANDING KIT